# Patient Record
Sex: FEMALE | Race: WHITE | NOT HISPANIC OR LATINO | Employment: OTHER | URBAN - METROPOLITAN AREA
[De-identification: names, ages, dates, MRNs, and addresses within clinical notes are randomized per-mention and may not be internally consistent; named-entity substitution may affect disease eponyms.]

---

## 2018-01-18 ENCOUNTER — TELEPHONE (OUTPATIENT)
Dept: ELECTROPHYSIOLOGY | Facility: CLINIC | Age: 78
End: 2018-01-18

## 2018-01-19 ENCOUNTER — OFFICE VISIT (OUTPATIENT)
Dept: CARDIOLOGY | Facility: CLINIC | Age: 78
End: 2018-01-19
Payer: MEDICARE

## 2018-01-19 VITALS — WEIGHT: 174 LBS | DIASTOLIC BLOOD PRESSURE: 84 MMHG | HEART RATE: 150 BPM | SYSTOLIC BLOOD PRESSURE: 127 MMHG

## 2018-01-19 DIAGNOSIS — I10 DIABETES MELLITUS WITH COINCIDENT HYPERTENSION: ICD-10-CM

## 2018-01-19 DIAGNOSIS — Z79.899 LONG TERM CURRENT USE OF ANTIARRHYTHMIC DRUG: ICD-10-CM

## 2018-01-19 DIAGNOSIS — Z92.89 HISTORY OF CARDIOVERSION: ICD-10-CM

## 2018-01-19 DIAGNOSIS — E11.9 DIABETES MELLITUS WITH COINCIDENT HYPERTENSION: ICD-10-CM

## 2018-01-19 DIAGNOSIS — Z95.0 CARDIAC PACEMAKER IN SITU: ICD-10-CM

## 2018-01-19 DIAGNOSIS — E78.5 DYSLIPIDEMIA: ICD-10-CM

## 2018-01-19 DIAGNOSIS — I48.19 PERSISTENT ATRIAL FIBRILLATION: ICD-10-CM

## 2018-01-19 DIAGNOSIS — I49.5 SICK SINUS SYNDROME: ICD-10-CM

## 2018-01-19 DIAGNOSIS — I10 ESSENTIAL HYPERTENSION: ICD-10-CM

## 2018-01-19 PROCEDURE — 99205 OFFICE O/P NEW HI 60 MIN: CPT | Mod: S$GLB,,, | Performed by: INTERNAL MEDICINE

## 2018-01-19 NOTE — PROGRESS NOTES
Subjective:     HPI    Cardiologist: Marc Olivares MD    I had the pleasure of seeing Sayda Mccartney in consultation at your request for the evaluation of atrial arrhythmias. She is a 77 year old female with a history of HTN, HLD, DM2, and sick sinus syndrome status-post [employer] dual chamber pacemaker implantation in 7/2008. Her AF history dates back prior to her pacemaker implantation, when she would have episodes of paroxysmal AF with RVR which would then revert to profound sinus bradycardia. She has been on Sotalol for many years, and has been on 160 mg bid for a number of years. She would have rare episodes lasting minutes up until recently, but has had three cardioversions for persistent AF over the past few months, including two in 1/2018 which were two days apart. Sotalol was changed to Amiodarone after the most recent DCCV. She is in Xarelto for stroke prevention.    I reviewed a device check dated 12/19/2017 which showed she had a <1% AF burden since 5/4/2017. Episodes would last on average several minutes, with the longest episode lasting 30 minutes.    Recent cardiac studies include an echo done in 1/2018 which showed an EF of 40% with concentric LVH, and no significant valvular disease.    An ECG dated 1/2/2018 shows sinus rhythm. Today's ECG shows AF with RVR.    Review of Systems   Constitution: Negative for decreased appetite, malaise/fatigue, weight gain and weight loss.   HENT: Negative for sore throat.    Eyes: Negative for blurred vision.   Cardiovascular: Positive for chest pain, dyspnea on exertion and palpitations. Negative for irregular heartbeat, leg swelling, near-syncope, orthopnea, paroxysmal nocturnal dyspnea and syncope.   Respiratory: Negative for shortness of breath.    Skin: Negative for rash.   Musculoskeletal: Negative for arthritis.   Gastrointestinal: Negative for abdominal pain.   Neurological: Negative for focal weakness.   Psychiatric/Behavioral: Negative for altered mental  status.        Objective:    Physical Exam   Constitutional: She is oriented to person, place, and time. She appears well-developed and well-nourished. No distress.   HENT:   Head: Normocephalic and atraumatic.   Mouth/Throat: Oropharynx is clear and moist.   Eyes: Conjunctivae are normal. Pupils are equal, round, and reactive to light. No scleral icterus.   Neck: Normal range of motion. Neck supple. No JVD present. No thyromegaly present.   Cardiovascular: Normal heart sounds and intact distal pulses.  An irregularly irregular rhythm present. Tachycardia present.  Exam reveals no gallop and no friction rub.    No murmur heard.  Pulmonary/Chest: Effort normal and breath sounds normal. No respiratory distress.   Abdominal: Soft. Bowel sounds are normal. She exhibits no distension.   Musculoskeletal: She exhibits no edema.   Neurological: She is alert and oriented to person, place, and time.   Skin: Skin is warm and dry.   Psychiatric: She has a normal mood and affect. Her behavior is normal.   Vitals reviewed.        Assessment:       1. Persistent atrial fibrillation    2. History of cardioversion    3. Long term current use of antiarrhythmic drug    4. Sick sinus syndrome    5. Cardiac pacemaker in situ    6. Essential hypertension    7. Dyslipidemia    8. Diabetes mellitus with coincident hypertension         Plan:     In summary, Sayda Mccartney is a 77 year old female with a history of sick sinus syndrome, pacemaker implantation, and symptomatic PAF which has recently become persistent. She has failed Sotalol. We briefly discussed the pros and cons of ablation vs Amiodarone, but given she is in AF with RVR at this point my priority is reestablishing sinus rhythm. I have scheduled her for DCCV next week (no BRYAN needed as she has been compliant with Xarelto). By that time she will have completed her Amiodarone load, making it much more likely that she will maintain sinus rhythm. She will see me in 4 weeks, at  which time we will review rhythm control options in more detail.    Thank you for allowing me to participate in the care of this patient. Please do not hesitate to call me with any questions or concerns.

## 2018-02-06 ENCOUNTER — TELEPHONE (OUTPATIENT)
Dept: ELECTROPHYSIOLOGY | Facility: CLINIC | Age: 78
End: 2018-02-06

## 2018-02-06 RX ORDER — METOPROLOL SUCCINATE 50 MG/1
50 TABLET, EXTENDED RELEASE ORAL DAILY
Qty: 30 TABLET | Refills: 11 | Status: SHIPPED | OUTPATIENT
Start: 2018-02-06 | End: 2018-04-27 | Stop reason: SDUPTHER

## 2018-02-06 NOTE — TELEPHONE ENCOUNTER
Pt underwent DCCV last week on Amiodarone but only stayed in sinus rhythm for 5 days before reverting back to AF.    Discussed risks, benefits, indications, and alternatives to PVI. Risks discussed include bleeding, vascular damage, cardiac tamponade, stroke, PV stenosis, AE fistula, phrenic nerve damage, and death. After considering her options she has agreed to proceed.    Ms. Mccartney is tachycardic in 130s bpm range currently. Have started Toprol XL 50 mg daily. Will contact her at end of week and double Toprol if she is still >110 bpm.

## 2018-02-09 DIAGNOSIS — I48.19 PERSISTENT ATRIAL FIBRILLATION: Primary | ICD-10-CM

## 2018-02-09 NOTE — PROGRESS NOTES
ABLATION EDUCATION CHECKLIST    2-28-18 - LAB WORK   PRE - PROCEDURE LABS HAVE BEEN ORDERED FOR YOU @ [facility]  BE SURE TO ARRIVE AT YOUR SCHEDULED TIME FOR THIS LAB WORK!    2-28-18 - CAT SCAN OF THE CHEST @ 8:00AM  [facility]      3-06-18 @ 6 AM - CARDIAC ABLATION  Report to Cardiology Waiting Room on 3rd floor of the Hospital    (Do not report to clinic)  Directions for Reporting to Cardiology Waiting Area in the Hospital  If you park in the Parking Garage:  Take elevators to the 2nd floor  Walk up ramp and turn right by Gold Elevators  Take elevator to the 3rd floor  Upon exiting the elevator, turn away from the clinic areas  Walk long phillips around to front of hospital to area with windows overlooking [facility]  Check in at Reception Desk  OR  If family is dropping you off:  Have them drop you off at the front of the Hospital  (Near the ER, where all the flags are hung).  Take the E elevators to the 3rd floor.  Check in at the Reception Desk in the waiting room.        Do not eat or drink anything after: 12 mn on the night before your procedure    Medications:   HOLD XARELTO  1 DAY PRIOR TO PROCEDURE. LAST DOSE 3/04/18.  You may take ALL OTHER morning medications with a sip of water    You will be spending the night after your procedure  You will need someone to drive you home the day after your procedure.    Your pain during your procedure will be managed by the anesthesia team.     THE ABOVE INSTRUCTIONS WERE GIVEN TO THE PATIENT VERBALLY AND THEY VERBALIZED UNDERSTANDING.  THEY DO NOT REQUIRE ANY SPECIAL NEEDS AND DO NOT HAVE ANY LEARNING BARRIERS.    Any need to reschedule or cancel procedures, or any questions regarding your procedures should be addressed directly with the Arrhythmia Department Nurses at the following phone number: [phone]

## 2018-03-02 ENCOUNTER — OFFICE VISIT (OUTPATIENT)
Dept: CARDIOLOGY | Facility: CLINIC | Age: 78
End: 2018-03-02
Payer: MEDICARE

## 2018-03-02 DIAGNOSIS — Z95.0 CARDIAC PACEMAKER IN SITU: ICD-10-CM

## 2018-03-02 DIAGNOSIS — E78.5 DYSLIPIDEMIA: ICD-10-CM

## 2018-03-02 DIAGNOSIS — I10 DIABETES MELLITUS WITH COINCIDENT HYPERTENSION: ICD-10-CM

## 2018-03-02 DIAGNOSIS — I10 ESSENTIAL HYPERTENSION: ICD-10-CM

## 2018-03-02 DIAGNOSIS — Z79.899 LONG TERM CURRENT USE OF ANTIARRHYTHMIC DRUG: ICD-10-CM

## 2018-03-02 DIAGNOSIS — E11.9 DIABETES MELLITUS WITH COINCIDENT HYPERTENSION: ICD-10-CM

## 2018-03-02 DIAGNOSIS — I48.19 PERSISTENT ATRIAL FIBRILLATION: Primary | ICD-10-CM

## 2018-03-02 DIAGNOSIS — I49.5 SICK SINUS SYNDROME: ICD-10-CM

## 2018-03-02 DIAGNOSIS — Z92.89 HISTORY OF CARDIOVERSION: ICD-10-CM

## 2018-03-02 PROCEDURE — 99215 OFFICE O/P EST HI 40 MIN: CPT | Mod: S$GLB,,, | Performed by: INTERNAL MEDICINE

## 2018-03-02 NOTE — PROGRESS NOTES
Subjective:     HPI    Cardiologist: Marc Olivares MD    I had the pleasure of seeing Sayda Mccartney in follow-up for her history of atrial arrhythmias. She is a 77 year old female with a history of HTN, HLD, DM2, and sick sinus syndrome status-post [employer] dual chamber pacemaker implantation in 7/2008. Her AF history dates back prior to her pacemaker implantation, when she would have episodes of paroxysmal AF with RVR which would then revert to profound sinus bradycardia. She has been on Sotalol for many years, and has been on 160 mg bid for a number of years. She would have rare episodes lasting minutes up until recently, but has had three cardioversions for persistent AF over the past few months, including two in 1/2018 which were two days apart. Sotalol was changed to Amiodarone after the most recent DCCV. She is in Xarelto for stroke prevention.    At her initial office visit, I reviewed a device check dated 12/19/2017 which showed she had a <1% AF burden since 5/4/2017. Episodes would last on average several minutes, with the longest episode lasting 30 minutes.    Recent cardiac studies include an echo done in 1/2018 which showed an EF of 40% with concentric LVH, and no significant valvular disease.    At her initial office visit, I recommended DCCV after her Amiodarone load was completed. A DCCV was performed, but she reverted to AF within 5 days.    I reviewed today's ECG tracing, which shows AF at 117 bpm.    Review of Systems   Constitution: Negative for decreased appetite, malaise/fatigue, weight gain and weight loss.   HENT: Negative for sore throat.    Eyes: Negative for blurred vision.   Cardiovascular: Positive for chest pain, dyspnea on exertion and palpitations. Negative for irregular heartbeat, leg swelling, near-syncope, orthopnea, paroxysmal nocturnal dyspnea and syncope.   Respiratory: Negative for shortness of breath.    Skin: Negative for rash.   Musculoskeletal: Negative for arthritis.    Gastrointestinal: Negative for abdominal pain.   Neurological: Negative for focal weakness.   Psychiatric/Behavioral: Negative for altered mental status.        Objective:    Physical Exam   Constitutional: She is oriented to person, place, and time. She appears well-developed and well-nourished. No distress.   HENT:   Head: Normocephalic and atraumatic.   Mouth/Throat: Oropharynx is clear and moist.   Eyes: Conjunctivae are normal. Pupils are equal, round, and reactive to light. No scleral icterus.   Neck: Normal range of motion. Neck supple. No JVD present. No thyromegaly present.   Cardiovascular: Normal heart sounds and intact distal pulses.  An irregularly irregular rhythm present. Tachycardia present.  Exam reveals no gallop and no friction rub.    No murmur heard.  Pulmonary/Chest: Effort normal and breath sounds normal. No respiratory distress.   Abdominal: Soft. Bowel sounds are normal. She exhibits no distension.   Musculoskeletal: She exhibits no edema.   Neurological: She is alert and oriented to person, place, and time.   Skin: Skin is warm and dry.   Psychiatric: She has a normal mood and affect. Her behavior is normal.   Vitals reviewed.        Assessment:       1. Persistent atrial fibrillation    2. Sick sinus syndrome    3. History of cardioversion    4. Essential hypertension    5. Dyslipidemia    6. Cardiac pacemaker in situ    7. Diabetes mellitus with coincident hypertension    8. Long term current use of antiarrhythmic drug         Plan:     In summary, Sayda Mccartney is a 77 year old female with a history of sick sinus syndrome, pacemaker implantation, and symptomatic PAF which has recently become persistent. She has failed Sotalol and Amiodarone. We discussed again the risks, benefits, indications, and alternatives to PVI. Risks discussed include bleeding, vascular damage, cardiac tamponade, stroke, PV stenosis, AE fistula, phrenic nerve damage, and death. After considering her options  she has agreed to proceed.     Thank you for allowing me to participate in the care of this patient. Please do not hesitate to call me with any questions or concerns.

## 2018-03-05 ENCOUNTER — ANESTHESIA EVENT (OUTPATIENT)
Dept: MEDSURG UNIT | Facility: HOSPITAL | Age: 78
End: 2018-03-05
Payer: MEDICARE

## 2018-03-05 ENCOUNTER — TELEPHONE (OUTPATIENT)
Dept: ELECTROPHYSIOLOGY | Facility: CLINIC | Age: 78
End: 2018-03-05

## 2018-03-05 NOTE — TELEPHONE ENCOUNTER
Contacted Pt to confirm procedure for tomorrow. Spoke with Pt's spouse. Reviewed time and instructions with him. He voiced understanding and stated she had received a text about tomorrow already.

## 2018-03-05 NOTE — ANESTHESIA PREPROCEDURE EVALUATION
"                                                                                                             03/05/2018  Sayda Mccartney is a 77 y.o., female.    Patient Active Problem List   Diagnosis    Persistent atrial fibrillation    History of cardioversion    Long term current use of antiarrhythmic drug    Sick sinus syndrome    Cardiac pacemaker in situ    Essential hypertension    Dyslipidemia    Diabetes mellitus with coincident hypertension       Anesthesia Evaluation    I have reviewed the Patient Summary Reports.    I have reviewed the Nursing Notes.   I have reviewed the Medications.     Review of Systems  Anesthesia Hx:  No problems with previous Anesthesia  History of prior surgery of interest to airway management or planning: Denies Family Hx of Anesthesia complications.   Denies Personal Hx of Anesthesia complications.   Hematology/Oncology:     Oncology Normal   Hematology Comments: Xarelto   EENT/Dental:EENT/Dental Normal   Cardiovascular:   Exercise tolerance: good Pacemaker Hypertension, well controlled Dysrhythmias (SSS) atrial fibrillation Angina, with exertion hyperlipidemia GODDARD ECG has been reviewed. "Recent cardiac studies include an echo done in 1/2018 which showed an EF of 40% with concentric LVH, and no significant valvular disease." from office visit with Dr. Henao.   Pulmonary:  Pulmonary Normal    Renal/:  Renal/ Normal     Hepatic/GI:  Hepatic/GI Normal    Musculoskeletal:  Spine Disorders: thoracic Degenerative disease    Neurological:  Neurology Normal    Endocrine:   Diabetes, type 2    Dermatological:  Skin Normal    Psych:  Psychiatric Normal           Physical Exam  General:  Well nourished    Airway/Jaw/Neck:  Airway Findings: Mouth Opening: Normal Tongue: Normal  General Airway Assessment: Adult  Mallampati: II  TM Distance: Normal, at least 6 cm        Eyes/Ears/Nose:  EYES/EARS/NOSE FINDINGS: Normal   Dental:  DENTAL FINDINGS: Normal "   Chest/Lungs:  Chest/Lungs Clear    Heart/Vascular:  Heart Findings: Rate: Tachycardia  Rhythm: Regular Rhythm  Heart murmur: negative Vascular Findings: Normal    Abdomen:  Abdomen Findings: Normal    Musculoskeletal:  Musculoskeletal Findings: Normal   Skin:  Skin Findings: Normal    Mental Status:  Mental Status Findings: Normal        Anesthesia Plan  Type of Anesthesia, risks & benefits discussed:  Anesthesia Type:  general  Patient's Preference:   Intra-op Monitoring Plan: standard ASA monitors and arterial line  Intra-op Monitoring Plan Comments:   Post Op Pain Control Plan: per primary service following discharge from PACU  Post Op Pain Control Plan Comments:   Induction:   IV  Beta Blocker:  Patient is on a Beta-Blocker and has received one dose within the past 24 hours (No further documentation required).       Informed Consent: Patient understands risks and agrees with Anesthesia plan.  Questions answered. Anesthesia consent signed with patient.  ASA Score: 3     Day of Surgery Review of History & Physical:    H&P update referred to the provider.  H&P completed by Anesthesiologist.   Anesthesia Plan Notes: Preop EKG showing SVT, endorses SOB at rest, mild dizziness.  EP to evaluate prior to starting procedure.         Ready For Surgery From Anesthesia Perspective.

## 2018-03-06 ENCOUNTER — HOSPITAL ENCOUNTER (OUTPATIENT)
Dept: CARDIOLOGY | Facility: CLINIC | Age: 78
Discharge: HOME | End: 2018-03-06
Attending: INTERNAL MEDICINE | Admitting: INTERNAL MEDICINE
Payer: MEDICARE

## 2018-03-06 ENCOUNTER — HOSPITAL ENCOUNTER (OUTPATIENT)
Facility: HOSPITAL | Age: 78
Discharge: HOME | End: 2018-03-07
Attending: INTERNAL MEDICINE | Admitting: INTERNAL MEDICINE
Payer: MEDICARE

## 2018-03-06 ENCOUNTER — ANESTHESIA (OUTPATIENT)
Dept: MEDSURG UNIT | Facility: HOSPITAL | Age: 78
End: 2018-03-06
Payer: MEDICARE

## 2018-03-06 DIAGNOSIS — I48.19 PERSISTENT ATRIAL FIBRILLATION: Primary | ICD-10-CM

## 2018-03-06 DIAGNOSIS — Z98.890 OTHER SPECIFIED POSTPROCEDURAL STATES: ICD-10-CM

## 2018-03-06 DIAGNOSIS — I48.91 ATRIAL FIBRILLATION: ICD-10-CM

## 2018-03-06 LAB
ABO + RH BLD: NORMAL
AORTIC ATHEROMA: YES
BLD GP AB SCN CELLS X3 SERPL QL: NORMAL
ESTIMATED PA SYSTOLIC PRESSURE: 27.67
MITRAL VALVE MOBILITY: NORMAL
MITRAL VALVE REGURGITATION: ABNORMAL
POCT GLUCOSE: 156 MG/DL (ref 70–110)
POCT GLUCOSE: 185 MG/DL (ref 70–110)
POCT GLUCOSE: 192 MG/DL (ref 70–110)
POCT GLUCOSE: 200 MG/DL (ref 70–110)
TRICUSPID VALVE REGURGITATION: ABNORMAL

## 2018-03-06 PROCEDURE — 93355 ECHO TRANSESOPHAGEAL (TEE): CPT | Mod: 59

## 2018-03-06 PROCEDURE — 36620 INSERTION CATHETER ARTERY: CPT | Mod: 59,,, | Performed by: ANESTHESIOLOGY

## 2018-03-06 PROCEDURE — 27000221 HC OXYGEN, UP TO 24 HOURS

## 2018-03-06 PROCEDURE — 25000003 PHARM REV CODE 250: Performed by: NURSE ANESTHETIST, CERTIFIED REGISTERED

## 2018-03-06 PROCEDURE — 63600175 PHARM REV CODE 636 W HCPCS

## 2018-03-06 PROCEDURE — D9220A PRA ANESTHESIA: Mod: ANES,,, | Performed by: ANESTHESIOLOGY

## 2018-03-06 PROCEDURE — 63600175 PHARM REV CODE 636 W HCPCS: Performed by: NURSE ANESTHETIST, CERTIFIED REGISTERED

## 2018-03-06 PROCEDURE — 93010 ELECTROCARDIOGRAM REPORT: CPT | Mod: 76,,, | Performed by: INTERNAL MEDICINE

## 2018-03-06 PROCEDURE — 37000009 HC ANESTHESIA EA ADD 15 MINS: Performed by: INTERNAL MEDICINE

## 2018-03-06 PROCEDURE — C1893 INTRO/SHEATH, FIXED,NON-PEEL: HCPCS

## 2018-03-06 PROCEDURE — 27200192 EP LAB PROCEDURE

## 2018-03-06 PROCEDURE — 27200049 EP LAB PROCEDURE

## 2018-03-06 PROCEDURE — 93662 INTRACARDIAC ECG (ICE): CPT | Mod: 26,,, | Performed by: INTERNAL MEDICINE

## 2018-03-06 PROCEDURE — 82962 GLUCOSE BLOOD TEST: CPT | Performed by: INTERNAL MEDICINE

## 2018-03-06 PROCEDURE — 94761 N-INVAS EAR/PLS OXIMETRY MLT: CPT

## 2018-03-06 PROCEDURE — 86850 RBC ANTIBODY SCREEN: CPT

## 2018-03-06 PROCEDURE — 93655 ICAR CATH ABLTJ DSCRT ARRHYT: CPT | Mod: ,,, | Performed by: INTERNAL MEDICINE

## 2018-03-06 PROCEDURE — 93656 COMPRE EP EVAL ABLTJ ATR FIB: CPT | Mod: ,,, | Performed by: INTERNAL MEDICINE

## 2018-03-06 PROCEDURE — 93005 ELECTROCARDIOGRAM TRACING: CPT | Mod: 59

## 2018-03-06 PROCEDURE — 27201037 HC PRESSURE MONITORING SET UP

## 2018-03-06 PROCEDURE — 25000003 PHARM REV CODE 250: Performed by: NURSE PRACTITIONER

## 2018-03-06 PROCEDURE — 82962 GLUCOSE BLOOD TEST: CPT | Mod: 91

## 2018-03-06 PROCEDURE — 25000003 PHARM REV CODE 250

## 2018-03-06 PROCEDURE — D9220A PRA ANESTHESIA: Mod: CRNA,,, | Performed by: NURSE ANESTHETIST, CERTIFIED REGISTERED

## 2018-03-06 PROCEDURE — 93355 ECHO TRANSESOPHAGEAL (TEE): CPT | Mod: ,,, | Performed by: INTERNAL MEDICINE

## 2018-03-06 PROCEDURE — 37000008 HC ANESTHESIA 1ST 15 MINUTES: Performed by: INTERNAL MEDICINE

## 2018-03-06 PROCEDURE — 93613 INTRACARDIAC EPHYS 3D MAPG: CPT | Mod: ,,, | Performed by: INTERNAL MEDICINE

## 2018-03-06 PROCEDURE — 63600175 PHARM REV CODE 636 W HCPCS: Performed by: NURSE PRACTITIONER

## 2018-03-06 PROCEDURE — 93010 ELECTROCARDIOGRAM REPORT: CPT | Mod: ,,, | Performed by: INTERNAL MEDICINE

## 2018-03-06 RX ORDER — HYDROCODONE BITARTRATE AND ACETAMINOPHEN 5; 325 MG/1; MG/1
1 TABLET ORAL EVERY 6 HOURS PRN
Status: DISCONTINUED | OUTPATIENT
Start: 2018-03-06 | End: 2018-03-07 | Stop reason: HOSPADM

## 2018-03-06 RX ORDER — AMLODIPINE BESYLATE 10 MG/1
10 TABLET ORAL DAILY
COMMUNITY

## 2018-03-06 RX ORDER — ERGOCALCIFEROL 1.25 MG/1
50000 CAPSULE ORAL
COMMUNITY

## 2018-03-06 RX ORDER — ESMOLOL HYDROCHLORIDE 10 MG/ML
INJECTION INTRAVENOUS
Status: DISCONTINUED | OUTPATIENT
Start: 2018-03-06 | End: 2018-03-06

## 2018-03-06 RX ORDER — CEFAZOLIN SODIUM 1 G/3ML
2 INJECTION, POWDER, FOR SOLUTION INTRAMUSCULAR; INTRAVENOUS
Status: CANCELLED | OUTPATIENT
Start: 2018-03-06 | End: 2018-03-07

## 2018-03-06 RX ORDER — PROPOFOL 10 MG/ML
VIAL (ML) INTRAVENOUS
Status: DISCONTINUED | OUTPATIENT
Start: 2018-03-06 | End: 2018-03-06

## 2018-03-06 RX ORDER — AMIODARONE HYDROCHLORIDE 200 MG/1
200 TABLET ORAL DAILY
Status: DISCONTINUED | OUTPATIENT
Start: 2018-03-07 | End: 2018-03-07 | Stop reason: HOSPADM

## 2018-03-06 RX ORDER — SODIUM CHLORIDE 9 MG/ML
INJECTION, SOLUTION INTRAVENOUS CONTINUOUS PRN
Status: DISCONTINUED | OUTPATIENT
Start: 2018-03-06 | End: 2018-03-06

## 2018-03-06 RX ORDER — HEPARIN SODIUM 10000 [USP'U]/100ML
INJECTION, SOLUTION INTRAVENOUS CONTINUOUS PRN
Status: DISCONTINUED | OUTPATIENT
Start: 2018-03-06 | End: 2018-03-06

## 2018-03-06 RX ORDER — POTASSIUM CHLORIDE 20 MEQ/15ML
20 SOLUTION ORAL DAILY
Status: DISCONTINUED | OUTPATIENT
Start: 2018-03-07 | End: 2018-03-07 | Stop reason: HOSPADM

## 2018-03-06 RX ORDER — FUROSEMIDE 10 MG/ML
INJECTION INTRAMUSCULAR; INTRAVENOUS
Status: DISCONTINUED | OUTPATIENT
Start: 2018-03-06 | End: 2018-03-06

## 2018-03-06 RX ORDER — NEOSTIGMINE METHYLSULFATE 1 MG/ML
INJECTION, SOLUTION INTRAVENOUS
Status: DISCONTINUED | OUTPATIENT
Start: 2018-03-06 | End: 2018-03-06

## 2018-03-06 RX ORDER — ASPIRIN 81 MG/1
81 TABLET ORAL NIGHTLY
COMMUNITY

## 2018-03-06 RX ORDER — IBUPROFEN 200 MG
400 TABLET ORAL EVERY 6 HOURS PRN
Status: DISCONTINUED | OUTPATIENT
Start: 2018-03-06 | End: 2018-03-07 | Stop reason: HOSPADM

## 2018-03-06 RX ORDER — LANOLIN ALCOHOL/MO/W.PET/CERES
400 CREAM (GRAM) TOPICAL NIGHTLY
COMMUNITY

## 2018-03-06 RX ORDER — PROTAMINE SULFATE 10 MG/ML
INJECTION, SOLUTION INTRAVENOUS
Status: DISCONTINUED | OUTPATIENT
Start: 2018-03-06 | End: 2018-03-06

## 2018-03-06 RX ORDER — OMEPRAZOLE 20 MG/1
20 CAPSULE, DELAYED RELEASE ORAL DAILY
Status: ON HOLD | COMMUNITY
End: 2018-03-07

## 2018-03-06 RX ORDER — MEPERIDINE HYDROCHLORIDE 50 MG/ML
25 INJECTION INTRAMUSCULAR; INTRAVENOUS; SUBCUTANEOUS ONCE
Status: COMPLETED | OUTPATIENT
Start: 2018-03-06 | End: 2018-03-06

## 2018-03-06 RX ORDER — ASPIRIN 81 MG/1
81 TABLET ORAL NIGHTLY
Status: DISCONTINUED | OUTPATIENT
Start: 2018-03-06 | End: 2018-03-07 | Stop reason: HOSPADM

## 2018-03-06 RX ORDER — ROCURONIUM BROMIDE 10 MG/ML
INJECTION, SOLUTION INTRAVENOUS
Status: DISCONTINUED | OUTPATIENT
Start: 2018-03-06 | End: 2018-03-06

## 2018-03-06 RX ORDER — GLYCOPYRROLATE 0.2 MG/ML
INJECTION INTRAMUSCULAR; INTRAVENOUS
Status: DISCONTINUED | OUTPATIENT
Start: 2018-03-06 | End: 2018-03-06

## 2018-03-06 RX ORDER — PHENYLEPHRINE HYDROCHLORIDE 10 MG/ML
INJECTION INTRAVENOUS
Status: DISCONTINUED | OUTPATIENT
Start: 2018-03-06 | End: 2018-03-06

## 2018-03-06 RX ORDER — VASOPRESSIN 20 [USP'U]/ML
INJECTION, SOLUTION INTRAMUSCULAR; SUBCUTANEOUS
Status: DISCONTINUED | OUTPATIENT
Start: 2018-03-06 | End: 2018-03-06

## 2018-03-06 RX ORDER — SODIUM CHLORIDE 9 MG/ML
INJECTION, SOLUTION INTRAVENOUS CONTINUOUS
Status: DISCONTINUED | OUTPATIENT
Start: 2018-03-06 | End: 2018-03-07 | Stop reason: HOSPADM

## 2018-03-06 RX ORDER — METOPROLOL SUCCINATE 25 MG/1
50 TABLET, EXTENDED RELEASE ORAL DAILY
Status: DISCONTINUED | OUTPATIENT
Start: 2018-03-07 | End: 2018-03-07 | Stop reason: HOSPADM

## 2018-03-06 RX ORDER — GLIPIZIDE 10 MG/1
10 TABLET ORAL NIGHTLY
COMMUNITY

## 2018-03-06 RX ORDER — PRAVASTATIN SODIUM 20 MG/1
40 TABLET ORAL NIGHTLY
Status: DISCONTINUED | OUTPATIENT
Start: 2018-03-06 | End: 2018-03-07 | Stop reason: HOSPADM

## 2018-03-06 RX ORDER — FENTANYL CITRATE 50 UG/ML
25 INJECTION, SOLUTION INTRAMUSCULAR; INTRAVENOUS EVERY 5 MIN PRN
Status: DISCONTINUED | OUTPATIENT
Start: 2018-03-06 | End: 2018-03-07 | Stop reason: HOSPADM

## 2018-03-06 RX ORDER — HEPARIN SODIUM 1000 [USP'U]/ML
INJECTION, SOLUTION INTRAVENOUS; SUBCUTANEOUS
Status: DISCONTINUED | OUTPATIENT
Start: 2018-03-06 | End: 2018-03-06

## 2018-03-06 RX ORDER — MORPHINE SULFATE 2 MG/ML
1 INJECTION, SOLUTION INTRAMUSCULAR; INTRAVENOUS ONCE
Status: DISCONTINUED | OUTPATIENT
Start: 2018-03-06 | End: 2018-03-06

## 2018-03-06 RX ORDER — FENTANYL CITRATE 50 UG/ML
INJECTION, SOLUTION INTRAMUSCULAR; INTRAVENOUS
Status: DISCONTINUED | OUTPATIENT
Start: 2018-03-06 | End: 2018-03-06

## 2018-03-06 RX ORDER — POTASSIUM GLUCONATE 595(99)MG
TABLET, EXTENDED RELEASE ORAL NIGHTLY
COMMUNITY

## 2018-03-06 RX ORDER — PRAVASTATIN SODIUM 40 MG/1
40 TABLET ORAL NIGHTLY
COMMUNITY

## 2018-03-06 RX ORDER — ONDANSETRON 2 MG/ML
INJECTION INTRAMUSCULAR; INTRAVENOUS
Status: DISCONTINUED | OUTPATIENT
Start: 2018-03-06 | End: 2018-03-06

## 2018-03-06 RX ORDER — MIDAZOLAM HYDROCHLORIDE 1 MG/ML
INJECTION, SOLUTION INTRAMUSCULAR; INTRAVENOUS
Status: DISCONTINUED | OUTPATIENT
Start: 2018-03-06 | End: 2018-03-06

## 2018-03-06 RX ORDER — AMIODARONE HYDROCHLORIDE 200 MG/1
200 TABLET ORAL DAILY
COMMUNITY

## 2018-03-06 RX ORDER — PANTOPRAZOLE SODIUM 40 MG/1
40 TABLET, DELAYED RELEASE ORAL DAILY
Status: DISCONTINUED | OUTPATIENT
Start: 2018-03-06 | End: 2018-03-06

## 2018-03-06 RX ORDER — ACETAMINOPHEN 325 MG/1
650 TABLET ORAL EVERY 6 HOURS PRN
Status: DISCONTINUED | OUTPATIENT
Start: 2018-03-06 | End: 2018-03-07 | Stop reason: HOSPADM

## 2018-03-06 RX ORDER — PANTOPRAZOLE SODIUM 40 MG/1
40 TABLET, DELAYED RELEASE ORAL DAILY
Status: DISCONTINUED | OUTPATIENT
Start: 2018-03-07 | End: 2018-03-07 | Stop reason: HOSPADM

## 2018-03-06 RX ORDER — SODIUM CHLORIDE 0.9 % (FLUSH) 0.9 %
3 SYRINGE (ML) INJECTION
Status: DISCONTINUED | OUTPATIENT
Start: 2018-03-06 | End: 2018-03-07 | Stop reason: HOSPADM

## 2018-03-06 RX ADMIN — HYDROCODONE BITARTRATE AND ACETAMINOPHEN 1 TABLET: 5; 325 TABLET ORAL at 08:03

## 2018-03-06 RX ADMIN — PROTAMINE SULFATE 90 MG: 10 INJECTION, SOLUTION INTRAVENOUS at 12:03

## 2018-03-06 RX ADMIN — MEPERIDINE HYDROCHLORIDE 25 MG: 50 INJECTION, SOLUTION INTRAMUSCULAR; INTRAVENOUS; SUBCUTANEOUS at 04:03

## 2018-03-06 RX ADMIN — PHENYLEPHRINE HYDROCHLORIDE 200 MCG: 10 INJECTION INTRAVENOUS at 07:03

## 2018-03-06 RX ADMIN — ONDANSETRON 4 MG: 2 INJECTION INTRAMUSCULAR; INTRAVENOUS at 11:03

## 2018-03-06 RX ADMIN — SODIUM CHLORIDE, SODIUM GLUCONATE, SODIUM ACETATE, POTASSIUM CHLORIDE, MAGNESIUM CHLORIDE, SODIUM PHOSPHATE, DIBASIC, AND POTASSIUM PHOSPHATE: .53; .5; .37; .037; .03; .012; .00082 INJECTION, SOLUTION INTRAVENOUS at 08:03

## 2018-03-06 RX ADMIN — HEPARIN SODIUM 6000 UNITS: 1000 INJECTION INTRAVENOUS; SUBCUTANEOUS at 09:03

## 2018-03-06 RX ADMIN — VASOPRESSIN 3 UNITS: 20 INJECTION INTRAVENOUS at 07:03

## 2018-03-06 RX ADMIN — HYDROCODONE BITARTRATE AND ACETAMINOPHEN 1 TABLET: 5; 325 TABLET ORAL at 02:03

## 2018-03-06 RX ADMIN — PROPOFOL 120 MG: 10 INJECTION, EMULSION INTRAVENOUS at 07:03

## 2018-03-06 RX ADMIN — MIDAZOLAM 2 MG: 1 INJECTION INTRAMUSCULAR; INTRAVENOUS at 07:03

## 2018-03-06 RX ADMIN — FUROSEMIDE 40 MG: 10 INJECTION, SOLUTION INTRAMUSCULAR; INTRAVENOUS at 11:03

## 2018-03-06 RX ADMIN — PHENYLEPHRINE HYDROCHLORIDE 200 MCG: 10 INJECTION INTRAVENOUS at 11:03

## 2018-03-06 RX ADMIN — HEPARIN SODIUM 9000 UNITS: 1000 INJECTION INTRAVENOUS; SUBCUTANEOUS at 08:03

## 2018-03-06 RX ADMIN — GLYCOPYRROLATE 0.4 MG: 0.2 INJECTION INTRAMUSCULAR; INTRAVENOUS at 12:03

## 2018-03-06 RX ADMIN — PRAVASTATIN SODIUM 40 MG: 20 TABLET ORAL at 08:03

## 2018-03-06 RX ADMIN — FENTANYL CITRATE 100 MCG: 50 INJECTION, SOLUTION INTRAMUSCULAR; INTRAVENOUS at 07:03

## 2018-03-06 RX ADMIN — NEOSTIGMINE METHYLSULFATE 4 MG: 1 INJECTION INTRAVENOUS at 12:03

## 2018-03-06 RX ADMIN — PROTAMINE SULFATE 10 MG: 10 INJECTION, SOLUTION INTRAVENOUS at 11:03

## 2018-03-06 RX ADMIN — VASOPRESSIN 1 UNITS: 20 INJECTION INTRAVENOUS at 07:03

## 2018-03-06 RX ADMIN — ESMOLOL HYDROCHLORIDE 30 MG: 10 INJECTION INTRAVENOUS at 07:03

## 2018-03-06 RX ADMIN — HEPARIN SODIUM AND DEXTROSE 4000 UNITS/HR: 10000; 5 INJECTION INTRAVENOUS at 09:03

## 2018-03-06 RX ADMIN — DEXTROSE 2 G: 50 INJECTION, SOLUTION INTRAVENOUS at 08:03

## 2018-03-06 RX ADMIN — IBUPROFEN 400 MG: 200 TABLET, FILM COATED ORAL at 07:03

## 2018-03-06 RX ADMIN — SODIUM CHLORIDE: 0.9 INJECTION, SOLUTION INTRAVENOUS at 07:03

## 2018-03-06 RX ADMIN — ROCURONIUM BROMIDE 30 MG: 10 INJECTION, SOLUTION INTRAVENOUS at 07:03

## 2018-03-06 RX ADMIN — SODIUM CHLORIDE 0.5 MCG/KG/MIN: 9 INJECTION, SOLUTION INTRAVENOUS at 07:03

## 2018-03-06 NOTE — H&P
TRANSESOPHAGEAL ECHOCARDIOGRAPHY   PRE-PROCEDURE NOTE    2018    HPI:     Sayda Mccartney is a 77 y.o. woman with PMHx of HTN, HLD, SSS s/p SJM dcPPM 2008, and atrial fibrillation s/p multiple DCCV's. She presents today for RFA PVI by Dr. Henao and is referred for BRYAN prior to her procedure.    Patient was previously taking sotalol, later switched to amiodarone for her AAD.  She has been anticoagulated with Xarelto, last dose was on 3/4.  Prior TTE at OSH showed an EF of 40%.    ECG this morning shows an SVT with a rate of 148 bpm.    Dysphagia or odynophagia:  No  Liver Disease, esophageal disease, or known varices:  No  Upper GI Bleeding: No  Snoring:  Yes  Sleep Apnea:  No  Prior neck surgery or radiation:  No  History of anesthetic difficulties:  No  Family history of anesthetic difficulties:  No  Last oral intake:  12 hours ago  Able to move neck in all directions:  Yes    Meds:     Scheduled Meds:  PRN Meds:  Continuous Infusions:   sodium chloride 0.9%         Physical Exam:     Vitals:  Temp:  [97.5 °F (36.4 °C)]   Pulse:  [146]   Resp:  [18]   BP: (100-109)/(61-69)   SpO2:  [97 %]        Constitutional: NAD, conversant  HEENT:   Sclera anicteric, Uvula midline, EOMI, OP clear  Neck:               No JVD, moves to all direction without any limitations  CV:               Regular, tachycardic, no murmurs / rubs / gallops, normal S1/S2  Pulm:               CTAB, no wheezes, rales, or ronchi  GI:               Abdomen soft, NTND, +BS  Extremities:              No LE edema, warm with palpable pulses  Neuro:   AAOX3, no focal motor deficits    Mallampati: 2  ASA: 3    Labs:     No results found for this or any previous visit (from the past 336 hour(s)).    No results found for this or any previous visit (from the past 336 hour(s)).    CrCl cannot be calculated (No order found.).    EK2018  SVT with a rate of 148 bpm    Assessment & Plan:     PLAN:  1. BRYAN for evaluation of CIARA/LA for thrombus  prior to RFA-PVI for persistent atrial fibrillation.    -The risks, benefits & alternatives of the procedure were explained to the patient.    -The risks of transesophageal echo include but are not limited to:  Dental trauma, esophageal trauma/perforation, bleeding, laryngospasm/brochospasm, aspiration, sore throat/hoarseness, & dislodgement of the endotracheal tube/nasogastric tube (where applicable).    -The risks of moderate sedation include hypotension, respiratory depression, arrhythmias, bronchospasm, & death.    -Informed consent was obtained & the patient is agreeable to proceed with the procedure.    I will discuss with the attending physician. Attending addendum is to follow.    Signed:  Jacinto Cho MD  Cardiology Fellow - PGY5  Pager: [phone]  3/6/2018 7:23 AM

## 2018-03-06 NOTE — NURSING TRANSFER
Nursing Transfer Note      3/6/2018     Transfer to 318    Transfer via stretcher    Transfer with portable o2 and tele    Transported by [employer], RN and PCT    Medicines sent: n/a    Chart send with patient: yes    Notified: son

## 2018-03-06 NOTE — INTERVAL H&P NOTE
The patient has been examined and the H&P has been reviewed:    I concur with the findings and no changes have occurred since H&P was written.  Presents for PVI-RFA.  Denies fever, chills, +SOB/+GODDARD- worsening in last 2 weeks.    She takes xarelto: last dose 2 mornings ago  Amiodarone and metoprolol last dose this morning.   Prior to procedure, extensive discussion with patient regarding risks and benefits of  PVI, and patient  would like to proceed.  Risks of procedure include but are not limited to the risk of infection, bleeding, stroke, paralysis,pulmonary vein stenosis, atrioesophageal fistula  MI, death, embolism, perforation requiring emergency draining or surgery, AV block, possibility for need for  pacemaker implantation.  The patient voices understanding and all questions have been answered. No further questions/concerns voiced at this time.      BRYAN prior to rule out thrombus   Sedation per anesthesia.     Active Hospital Problems    Diagnosis  POA    Atrial fibrillation [I48.91]  Yes      Resolved Hospital Problems    Diagnosis Date Resolved POA   No resolved problems to display.     Ifrah Chin, MN, APRN, FNP-BC  Nurse Practitioner  Cardiac Electrophysiology

## 2018-03-06 NOTE — TRANSFER OF CARE
"Anesthesia Transfer of Care Note    Patient: Sayda Mccartney    Procedure(s) Performed: Procedure(s) (LRB):  ABLATION (N/A)  TRANSESOPHAGEAL ECHOCARDIOGRAM (BRYAN) (N/A)    Patient location: PACU    Anesthesia Type: general    Transport from OR: Transported from OR on room air with adequate spontaneous ventilation. Transported from OR on 6-10 L/min O2 by face mask with adequate spontaneous ventilation    Post pain: adequate analgesia    Post assessment: no apparent anesthetic complications and tolerated procedure well    Post vital signs: stable    Level of consciousness: awake and alert    Nausea/Vomiting: no nausea/vomiting    Complications: none    Transfer of care protocol was followed      Last vitals:   Visit Vitals  BP (!) 96/50 (BP Location: Left arm, Patient Position: Lying)   Pulse 97   Temp 36.3 °C (97.3 °F) (Temporal)   Resp 20   Ht 5' 4" (1.626 m)   Wt 77.1 kg (170 lb)   SpO2 100%   Breastfeeding? No   BMI 29.18 kg/m²     "

## 2018-03-06 NOTE — PLAN OF CARE
Problem: Patient Care Overview  Goal: Plan of Care Review  Outcome: Ongoing (interventions implemented as appropriate)  Vital signs stable. Afebrile. Alert, oriented and following commands. PRN pain meds for back pain. Denies nausea. Some swelling to right groin site but remains stable. Left groin site remains well approximated and without redness or hematoma. Hogan intact and draining. Family at bedside and updated regarding POC. All questions, concerns addressed.

## 2018-03-06 NOTE — PLAN OF CARE
Problem: Patient Care Overview  Goal: Plan of Care Review  Outcome: Ongoing (interventions implemented as appropriate)  Pt transferred from recovery via stretcher.  Vss.  Pt aao, family called to bs.  wallace groin sites remain cdi.  0 bleed, 0 hematoma.  Sutures intact.  Post op orders and assessment initiated.  Pt in no acute distress and verbalizes no complaints.  Call bell within reach.  Will continue to monitor.

## 2018-03-06 NOTE — ANESTHESIA PROCEDURE NOTES
Arterial    Diagnosis: Afib    Patient location during procedure: done in OR  Procedure start time: 3/6/2018 7:35 AM  Timeout: 3/6/2018 7:35 AM  Procedure end time: 3/6/2018 7:40 AM  Staffing  Anesthesiologist: NIRMALA ALEXANDRE  Performed: anesthesiologist   Anesthesiologist was present at the time of the procedure.  Preanesthetic Checklist  Completed: patient identified, site marked, surgical consent, pre-op evaluation, timeout performed, IV checked, risks and benefits discussed, monitors and equipment checked and anesthesia consent givenArterial  Skin Prep: chlorhexidine gluconate  Local Infiltration: none  Orientation: right  Location: radial  Catheter Size: 20 G  Catheter placement by Anatomical landmarks. Heme positive aspiration all ports.Insertion Attempts: 1  Assessment  Dressing: secured with tape and tegaderm  Patient: Tolerated well

## 2018-03-06 NOTE — NURSING
ELIZABETH gan and STEFANY Jc called to bedside, holding pressure to r side hematoma.  Sutures removed.  Vss.  Pt complaints of back pain.  Orders received and initiated for pain medication.    Pressure held for 45/50mins, Dr. Henao at bedside, applying safeguard to wallace. Groin sites.   wallace groin sites now soft, no bleeding, no hematoma.  Orders received and initiated to keep safeguards to wallace groin sites until 9pm,  pt to remain on bedrest until 11pm.      call bell within reach.  Family at bedside.  Will continue to monitor.

## 2018-03-07 VITALS
HEIGHT: 64 IN | TEMPERATURE: 98 F | BODY MASS INDEX: 29.02 KG/M2 | WEIGHT: 170 LBS | HEART RATE: 58 BPM | DIASTOLIC BLOOD PRESSURE: 53 MMHG | SYSTOLIC BLOOD PRESSURE: 108 MMHG | RESPIRATION RATE: 18 BRPM | OXYGEN SATURATION: 96 %

## 2018-03-07 PROBLEM — Z79.01 CURRENT USE OF ANTICOAGULANT THERAPY: Status: ACTIVE | Noted: 2018-03-07

## 2018-03-07 LAB
POC ACTIVATED CLOTTING TIME K: 164 SEC (ref 74–137)
POC ACTIVATED CLOTTING TIME K: 197 SEC (ref 74–137)
POC ACTIVATED CLOTTING TIME K: 252 SEC (ref 74–137)
POC ACTIVATED CLOTTING TIME K: 357 SEC (ref 74–137)
POC ACTIVATED CLOTTING TIME K: 395 SEC (ref 74–137)
POC ACTIVATED CLOTTING TIME K: 433 SEC (ref 74–137)
POC ACTIVATED CLOTTING TIME K: 450 SEC (ref 74–137)
POC ACTIVATED CLOTTING TIME K: 450 SEC (ref 74–137)
SAMPLE: ABNORMAL

## 2018-03-07 PROCEDURE — 82962 GLUCOSE BLOOD TEST: CPT

## 2018-03-07 PROCEDURE — 51798 US URINE CAPACITY MEASURE: CPT

## 2018-03-07 PROCEDURE — 25000003 PHARM REV CODE 250: Performed by: NURSE PRACTITIONER

## 2018-03-07 RX ORDER — OMEPRAZOLE 20 MG/1
20 CAPSULE, DELAYED RELEASE ORAL DAILY
Start: 2018-03-07

## 2018-03-07 RX ORDER — PANTOPRAZOLE SODIUM 40 MG/1
40 TABLET, DELAYED RELEASE ORAL DAILY
Qty: 30 TABLET | Refills: 0 | Status: SHIPPED | OUTPATIENT
Start: 2018-03-08 | End: 2018-04-07

## 2018-03-07 RX ORDER — IBUPROFEN 400 MG/1
400 TABLET ORAL EVERY 6 HOURS PRN
Refills: 0 | COMMUNITY
Start: 2018-03-07

## 2018-03-07 RX ADMIN — PANTOPRAZOLE SODIUM 40 MG: 40 TABLET, DELAYED RELEASE ORAL at 08:03

## 2018-03-07 RX ADMIN — AMIODARONE HYDROCHLORIDE 200 MG: 200 TABLET ORAL at 08:03

## 2018-03-07 RX ADMIN — METOPROLOL SUCCINATE 50 MG: 25 TABLET, EXTENDED RELEASE ORAL at 08:03

## 2018-03-07 RX ADMIN — ASPIRIN 81 MG: 81 TABLET, COATED ORAL at 12:03

## 2018-03-07 RX ADMIN — POTASSIUM CHLORIDE 20 MEQ: 20 SOLUTION ORAL at 08:03

## 2018-03-07 RX ADMIN — RIVAROXABAN 20 MG: 20 TABLET, FILM COATED ORAL at 09:03

## 2018-03-07 NOTE — HPI
Sayda Mccartney is a 77 year old female with a history of sick sinus syndrome, pacemaker implantation, and symptomatic PAF which has recently become persistent. She has failed Sotalol and Amiodarone

## 2018-03-07 NOTE — ANESTHESIA POSTPROCEDURE EVALUATION
"Anesthesia Post Evaluation    Patient: Sayda Mccartney    Procedure(s) Performed: Procedure(s) (LRB):  ABLATION (N/A)  TRANSESOPHAGEAL ECHOCARDIOGRAM (BRYAN) (N/A)    Final Anesthesia Type: general  Patient location during evaluation: telemetry step down  Patient participation: Yes- Able to Participate  Level of consciousness: awake and alert, oriented and awake  Post-procedure vital signs: reviewed and stable  Pain management: adequate  Airway patency: patent  PONV status at discharge: No PONV  Anesthetic complications: no      Cardiovascular status: blood pressure returned to baseline and hemodynamically stable  Respiratory status: unassisted, spontaneous ventilation and room air  Hydration status: euvolemic  Follow-up not needed.        Visit Vitals  BP (!) 120/58 (BP Location: Left arm, Patient Position: Lying)   Pulse 60   Temp 36.9 °C (98.4 °F) (Oral)   Resp 18   Ht 5' 4" (1.626 m)   Wt 77.1 kg (170 lb)   SpO2 98%   Breastfeeding? No   BMI 29.18 kg/m²       Pain/Haseeb Score: Pain Assessment Performed: Yes (3/7/2018  9:00 AM)  Presence of Pain: denies (3/7/2018  9:00 AM)  Pain Rating Prior to Med Admin: 7 (3/6/2018  8:17 PM)  Pain Rating Post Med Admin: 3 (3/6/2018  9:17 PM)  Haseeb Score: 9 (3/6/2018  2:45 PM)      "

## 2018-03-07 NOTE — PROGRESS NOTES
[facility]  Cardiac Electrophysiology  Progress Note    Admission Date: 3/6/2018  Code Status: No Order   Attending Physician: Dennys Henao MD   Expected Discharge Date: 3/7/2018  Principal Problem:Atrial fibrillation    Subjective:     Interval History: Post PVI-RFA Day #1.  Feels great.    Review of Systems   Constitution: Negative for chills, decreased appetite, fever, weakness, weight gain and weight loss.   Eyes: Negative for blurred vision.   Cardiovascular: Negative for chest pain, claudication, leg swelling and syncope.   Respiratory: Negative for cough and shortness of breath.    Hematologic/Lymphatic: Negative for bleeding problem. Does not bruise/bleed easily.   Skin: Negative for rash.   Musculoskeletal: Negative for joint pain, muscle cramps and muscle weakness.   Gastrointestinal: Negative for abdominal pain, change in bowel habit, diarrhea, nausea and vomiting.   Genitourinary: Negative for bladder incontinence.   Neurological: Negative for headaches, numbness and paresthesias.   Psychiatric/Behavioral: Negative for altered mental status.     Objective:     Vital Signs (Most Recent):  Temp: 98.4 °F (36.9 °C) (03/07/18 0725)  Pulse: 60 (03/07/18 0800)  Resp: 18 (03/07/18 0725)  BP: (!) 108/53 (03/07/18 0725)  SpO2: (!) 93 % (03/07/18 0725) Vital Signs (24h Range):  Temp:  [97 °F (36.1 °C)-98.4 °F (36.9 °C)] 98.4 °F (36.9 °C)  Pulse:  [59-97] 60  Resp:  [14-23] 18  SpO2:  [92 %-100 %] 93 %  BP: ()/(47-65) 108/53     Weight: 77.1 kg (170 lb)  Body mass index is 29.18 kg/m².     SpO2: (!) 93 %  O2 Device (Oxygen Therapy): room air    Physical Exam   Constitutional: She is oriented to person, place, and time. She appears well-developed and well-nourished.   Neck: Neck supple.   Cardiovascular: Normal rate, regular rhythm and intact distal pulses.    Pulmonary/Chest: Effort normal. No stridor. No respiratory distress. She has no wheezes.   Abdominal: Soft. She exhibits no  distension. There is no tenderness.   Musculoskeletal: Normal range of motion.   Neurological: She is alert and oriented to person, place, and time. She exhibits normal muscle tone. Coordination normal.   Skin: Skin is warm and dry. No rash noted. No erythema.   Psychiatric: She has a normal mood and affect.   Vitals reviewed.      Assessment and Plan:     * Atrial fibrillation    S/p RFA-PVI post day #1  Bilateral groin hematomas stable with bruising noted R>L  No void post thrasher d/c about 8 hours ago; bladder scan at 7am with 75 cc  Oral intake encouraged        Current use of anticoagulant therapy    Xarelto dose given with breakfast this morning which patient reports is her biggest meal of the day  Observe for bleeding 1-2 hours post xarelto             Ifrah Chin NP  Cardiac Electrophysiology  [facility]

## 2018-03-07 NOTE — SUBJECTIVE & OBJECTIVE
Interval History: Post PVI-RFA Day #1.  Feels great.    Review of Systems   Constitution: Negative for chills, decreased appetite, fever, weakness, weight gain and weight loss.   Eyes: Negative for blurred vision.   Cardiovascular: Negative for chest pain, claudication, leg swelling and syncope.   Respiratory: Negative for cough and shortness of breath.    Hematologic/Lymphatic: Negative for bleeding problem. Does not bruise/bleed easily.   Skin: Negative for rash.   Musculoskeletal: Negative for joint pain, muscle cramps and muscle weakness.   Gastrointestinal: Negative for abdominal pain, change in bowel habit, diarrhea, nausea and vomiting.   Genitourinary: Negative for bladder incontinence.   Neurological: Negative for headaches, numbness and paresthesias.   Psychiatric/Behavioral: Negative for altered mental status.     Objective:     Vital Signs (Most Recent):  Temp: 98.4 °F (36.9 °C) (03/07/18 0725)  Pulse: 60 (03/07/18 0800)  Resp: 18 (03/07/18 0725)  BP: (!) 108/53 (03/07/18 0725)  SpO2: (!) 93 % (03/07/18 0725) Vital Signs (24h Range):  Temp:  [97 °F (36.1 °C)-98.4 °F (36.9 °C)] 98.4 °F (36.9 °C)  Pulse:  [59-97] 60  Resp:  [14-23] 18  SpO2:  [92 %-100 %] 93 %  BP: ()/(47-65) 108/53     Weight: 77.1 kg (170 lb)  Body mass index is 29.18 kg/m².     SpO2: (!) 93 %  O2 Device (Oxygen Therapy): room air    Physical Exam   Constitutional: She is oriented to person, place, and time. She appears well-developed and well-nourished.   Neck: Neck supple.   Cardiovascular: Normal rate, regular rhythm and intact distal pulses.    Pulmonary/Chest: Effort normal. No stridor. No respiratory distress. She has no wheezes.   Abdominal: Soft. She exhibits no distension. There is no tenderness.   Musculoskeletal: Normal range of motion.   Neurological: She is alert and oriented to person, place, and time. She exhibits normal muscle tone. Coordination normal.   Skin: Skin is warm and dry. No rash noted. No erythema.    Psychiatric: She has a normal mood and affect.   Vitals reviewed.

## 2018-03-07 NOTE — PLAN OF CARE
Problem: Patient Care Overview  Goal: Plan of Care Review  Removed safeguards last night without bleeding.   Applied gauze and tegaderm.  Pt ambulated around 2300 last night, tolerated well.  Bilaral groin sites remained CDI, no bleeding or hematoma after ambulation. Pt does have chronic back pain and took Norco last night.  Pt remained on oxygen last, she states she uses 2L at home at night.  Oxygen sats have been in the high 90s overnight.  Will cont to monitor.

## 2018-03-07 NOTE — PROGRESS NOTES
Pt DC'd per MD order. Discharge instructions given including activity,  wound care, S&S of infections, future appointments, and when to call MD. Medications reviewed including drug name, indication, dosage, frequency, and when to take next dose. Telemetry and PIV DC'd, catheter tip intact. Pt left unit via wheelchair with family.

## 2018-03-07 NOTE — DISCHARGE SUMMARY
[facility]  Cardiac Electrophysiology  Discharge Summary      Patient Name: Sayda Mccartney  MRN: [id number]  Admission Date: 3/6/2018  Hospital Length of Stay: 0 days  Discharge Date and Time: 3/7/2018 11:55 AM  Attending Physician: Dennys Henao MD    Discharging Provider: Ifrah Chin NP  Primary Care Physician: Sarah Comer    HPI:   Sayda Mccartney is a 77 year old female with a history of sick sinus syndrome, pacemaker implantation, and symptomatic PAF which has recently become persistent. She has failed Sotalol and Amiodarone    Procedure(s) (LRB):  ABLATION (N/A)  TRANSESOPHAGEAL ECHOCARDIOGRAM (BRYAN) (N/A)     Hospital Course:  S/p PVI-Ablation on 3/6/18 (see procedure note for details). Tolerated procedure. Bleeding with hematoma to groins several hours post procedure after patient reportedly coughed. Dr. Henao at bedside, pressure applied; safe guards applied to bilateral groins once hemostasis achieved. No issues overnight. Telemetry reviewed. Tolerating diet, ambulating, voiding, pain well controlled. Bilateral groins with Tegaderm; no active bleeding. Hematoma and bruising/discoloration to Bilateral groins stable without increase in size following safe guard removal last night.  Discharge plans/instructions discussed with patient and family who verbalized understanding and agreement with plans of care.  No further questions or concerns voiced at this time.    Consults: Anesthesia    Final Active Diagnoses:    Diagnosis Date Noted POA    PRINCIPAL PROBLEM:  Atrial fibrillation [I48.91] 03/06/2018 Yes    Current use of anticoagulant therapy [Z79.01] 03/07/2018 Not Applicable    Cardiac pacemaker in situ [Z95.0] 01/19/2018 Yes      Problems Resolved During this Admission:    Diagnosis Date Noted Date Resolved POA     Discharged Condition: good    Disposition: Home or Self Care    Follow Up:  Follow-up Information     Dennys Henao MD In 8 weeks.    Specialties:   Electrophysiology, Cardiovascular Disease  Why:  post RFA-PVI  Contact information:  [address]  [facility] [id number]  [phone]                 Patient Instructions:     Diet Cardiac     Diet diabetic     Activity as tolerated     Lifting restrictions   Order Comments: No lifting more than 5-10 pounds x 1 week     Call MD for:  temperature >100.4     Call MD for:  persistent nausea and vomiting or diarrhea     Call MD for:  severe uncontrolled pain     Call MD for:  redness, tenderness, or signs of infection (pain, swelling, redness, odor or green/yellow discharge around incision site)     Call MD for:  difficulty breathing or increased cough     Call MD for:  severe persistent headache     Call MD for:  worsening rash     Call MD for:  persistent dizziness, light-headedness, or visual disturbances     Call MD for:  increased confusion or weakness     Call MD for:   Order Comments: If unable to void.   Any concerns regarding procedure     Change dressing (specify)   Order Comments: May remove dressings tonight. No tub baths or soaking in water x 3 days.  Apply warm compresses to groin as needed.       Medications:  Reconciled Home Medications:   Current Discharge Medication List      START taking these medications    Details   ibuprofen (ADVIL,MOTRIN) 400 MG tablet Take 1 tablet (400 mg total) by mouth every 6 (six) hours as needed for Pain (chest discomfort).  Refills: 0      pantoprazole (PROTONIX) 40 MG tablet Take 1 tablet (40 mg total) by mouth once daily.  Qty: 30 tablet, Refills: 0         CONTINUE these medications which have CHANGED    Details   omeprazole (PRILOSEC) 20 MG capsule Take 1 capsule (20 mg total) by mouth once daily. May resume after protonix/pantoprazole prescription completed         CONTINUE these medications which have NOT CHANGED    Details   amiodarone (PACERONE) 200 MG Tab Take by mouth once daily.      amLODIPine (NORVASC) 10 MG tablet Take 10 mg by mouth once daily.       aspirin (ECOTRIN) 81 MG EC tablet Take 81 mg by mouth nightly.      CALCIUM CARBONATE/VITAMIN D3 (CALCIUM 600 WITH VITAMIN D3 ORAL) Take by mouth nightly.      ergocalciferol (VITAMIN D2) 50,000 unit Cap Take 50,000 Units by mouth every 7 days.      glipiZIDE (GLUCOTROL) 10 MG tablet Take 10 mg by mouth nightly.      magnesium oxide (MAG-OX) 400 mg tablet Take 400 mg by mouth nightly.      metoprolol succinate (TOPROL-XL) 50 MG 24 hr tablet Take 1 tablet (50 mg total) by mouth once daily.  Qty: 30 tablet, Refills: 11      potassium gluconate 595 mg (99 mg) TbSR Take by mouth nightly.      pravastatin (PRAVACHOL) 40 MG tablet Take 40 mg by mouth nightly.      rivaroxaban (XARELTO) 20 mg Tab Take 20 mg by mouth daily with dinner or evening meal.      SITagliptan-metformin (JANUMET) 50-1,000 mg per tablet Take 1 tablet by mouth 2 (two) times daily with meals.           Ifrah Chin NP  Cardiac Electrophysiology  [facility]

## 2018-03-07 NOTE — PLAN OF CARE
Problem: Patient Care Overview  Goal: Plan of Care Review  Outcome: Ongoing (interventions implemented as appropriate)  Plan of care discussed with patient. Patient is free of fall/trauma/injury. Denies CP, SOB, or pain/discomfort. All questions addressed. Will continue to monitor. Patient due to void, bladder scan done this AM around 0630, showed  cc. Patient encouraged to drink more fluids and walk around.

## 2018-03-07 NOTE — ASSESSMENT & PLAN NOTE
S/p RFA-PVI post day #1  Bilateral groin hematomas stable with bruising noted R>L  No void post thrasher d/c about 8 hours ago; bladder scan at 7am with 75 cc  Oral intake encouraged

## 2018-03-07 NOTE — ASSESSMENT & PLAN NOTE
Xarelto dose given with breakfast this morning which patient reports is her biggest meal of the day  Observe for bleeding 1-2 hours post xarelto

## 2018-03-08 LAB — POCT GLUCOSE: 157 MG/DL (ref 70–110)

## 2018-04-27 ENCOUNTER — OFFICE VISIT (OUTPATIENT)
Dept: CARDIOLOGY | Facility: CLINIC | Age: 78
End: 2018-04-27
Payer: MEDICARE

## 2018-04-27 DIAGNOSIS — E78.5 DYSLIPIDEMIA: ICD-10-CM

## 2018-04-27 DIAGNOSIS — Z95.0 CARDIAC PACEMAKER IN SITU: ICD-10-CM

## 2018-04-27 DIAGNOSIS — Z98.890 S/P ABLATION OF ATRIAL FIBRILLATION: ICD-10-CM

## 2018-04-27 DIAGNOSIS — I49.5 SICK SINUS SYNDROME: ICD-10-CM

## 2018-04-27 DIAGNOSIS — Z79.899 LONG TERM CURRENT USE OF ANTIARRHYTHMIC DRUG: ICD-10-CM

## 2018-04-27 DIAGNOSIS — I10 DIABETES MELLITUS WITH COINCIDENT HYPERTENSION: ICD-10-CM

## 2018-04-27 DIAGNOSIS — Z92.89 HISTORY OF CARDIOVERSION: ICD-10-CM

## 2018-04-27 DIAGNOSIS — E11.9 DIABETES MELLITUS WITH COINCIDENT HYPERTENSION: ICD-10-CM

## 2018-04-27 DIAGNOSIS — Z79.01 CURRENT USE OF ANTICOAGULANT THERAPY: ICD-10-CM

## 2018-04-27 DIAGNOSIS — I10 ESSENTIAL HYPERTENSION: ICD-10-CM

## 2018-04-27 DIAGNOSIS — Z86.79 S/P ABLATION OF ATRIAL FIBRILLATION: ICD-10-CM

## 2018-04-27 DIAGNOSIS — I48.19 PERSISTENT ATRIAL FIBRILLATION: Primary | ICD-10-CM

## 2018-04-27 DIAGNOSIS — I47.19 ATRIAL TACHYCARDIA: ICD-10-CM

## 2018-04-27 PROBLEM — I48.91 ATRIAL FIBRILLATION: Status: RESOLVED | Noted: 2018-03-06 | Resolved: 2018-04-27

## 2018-04-27 PROCEDURE — 99214 OFFICE O/P EST MOD 30 MIN: CPT | Mod: ,,, | Performed by: INTERNAL MEDICINE

## 2018-04-27 RX ORDER — METOPROLOL SUCCINATE 25 MG/1
75 TABLET, EXTENDED RELEASE ORAL DAILY
Start: 2018-04-27 | End: 2018-05-15 | Stop reason: SDUPTHER

## 2018-04-27 NOTE — PROGRESS NOTES
Subjective:     [facility]    Cardiologist: Marc Olivares MD    I had the pleasure of seeing Sayda Mccartney in follow-up for her history of atrial arrhythmias. She is a 77 year old female with a history of HTN, HLD, DM2, and sick sinus syndrome status-post [employer] dual chamber pacemaker implantation in 7/2008. Her AF history dates back prior to her pacemaker implantation, when she would have episodes of paroxysmal AF with RVR which would then revert to profound sinus bradycardia. She has been on Sotalol for many years, and has been on 160 mg bid for a number of years. She would have rare episodes lasting minutes up until recently, but has had three cardioversions for persistent AF over the past few months, including two in 1/2018 which were two days apart. Sotalol was changed to Amiodarone after the most recent DCCV. She is in Xarelto for stroke prevention.    At her initial office visit, I reviewed a device check dated 12/19/2017 which showed she had a <1% AF burden since 5/4/2017. Episodes would last on average several minutes, with the longest episode lasting 30 minutes.    Recent cardiac studies include an echo done in 1/2018 which showed an EF of 40% with concentric LVH, and no significant valvular disease.    At her initial office visit, I recommended DCCV after her Amiodarone load was completed. A DCCV was performed, but she reverted to AF within 5 days.    On 3/5/2018, a PVI was perfomed. In addition to PVI, three ATs were successfully targeted during the case (roof AT adjacent to the LSPV, AT arising from the anterior base of the CIARA, mid-septal AT), with ablation of the septal AT restoring sinus rhythm. She was discharged on Amiodarone.     On 4/24/2018, Ms. Mccartney presented to [facility] with dizziness and near syncope, and was found to be in atrial flutter with RVR at 113 bpm. She spontaneously converted to sinus rhythm. Notably, Ms. Mccartney is under a great amount of stress due to her , who is likely going  to placed in hospice in the near future.    I reviewed today's ECG tracing, which shows atrial tachycardia  ms and a ventricular rate of 98 bpm.    Review of Systems   Constitution: Negative for decreased appetite, malaise/fatigue, weight gain and weight loss.   HENT: Negative for sore throat.    Eyes: Negative for blurred vision.   Cardiovascular: Positive for chest pain, dyspnea on exertion and palpitations. Negative for irregular heartbeat, leg swelling, near-syncope, orthopnea, paroxysmal nocturnal dyspnea and syncope.   Respiratory: Negative for shortness of breath.    Skin: Negative for rash.   Musculoskeletal: Negative for arthritis.   Gastrointestinal: Negative for abdominal pain.   Neurological: Negative for focal weakness.   Psychiatric/Behavioral: Negative for altered mental status.        Objective:    Physical Exam   Constitutional: She is oriented to person, place, and time. She appears well-developed and well-nourished. No distress.   HENT:   Head: Normocephalic and atraumatic.   Mouth/Throat: Oropharynx is clear and moist.   Eyes: Conjunctivae are normal. Pupils are equal, round, and reactive to light. No scleral icterus.   Neck: Normal range of motion. Neck supple. No JVD present. No thyromegaly present.   Cardiovascular: Normal heart sounds and intact distal pulses.  An irregularly irregular rhythm present. Tachycardia present.  Exam reveals no gallop and no friction rub.    No murmur heard.  Pulmonary/Chest: Effort normal and breath sounds normal. No respiratory distress.   Abdominal: Soft. Bowel sounds are normal. She exhibits no distension.   Musculoskeletal: She exhibits no edema.   Neurological: She is alert and oriented to person, place, and time.   Skin: Skin is warm and dry.   Psychiatric: She has a normal mood and affect. Her behavior is normal.   Vitals reviewed.        Assessment:       1. Persistent atrial fibrillation    2. S/P ablation of atrial fibrillation    3. History of  cardioversion    4. Sick sinus syndrome    5. Atrial tachycardia    6. Essential hypertension    7. Dyslipidemia    8. Cardiac pacemaker in situ    9. Current use of anticoagulant therapy    10. Diabetes mellitus with coincident hypertension    11. Long term current use of antiarrhythmic drug         Plan:     In summary, Sayda Mccartney is a 77 year old female with a history of sick sinus syndrome, pacemaker implantation, and symptomatic PAF which has recently become persistent. She has failed Sotalol and Amiodarone. She is now status-post PVI, and is currently in rate controlled atrial tachycardia. She is still in the post-procedure blanking period. The plan is to increase Toprol XL to 75 mg daily, and schedule for DCCV in late May, or sooner if her symptoms worsen. She will follow-up with me in 6/2018. If she is still having arrhythmia issues at that point we will discuss the pros and cons of re-do PVI.    Thank you for allowing me to participate in the care of this patient. Please do not hesitate to call me with any questions or concerns.

## 2018-05-15 ENCOUNTER — TELEPHONE (OUTPATIENT)
Dept: ELECTROPHYSIOLOGY | Facility: CLINIC | Age: 78
End: 2018-05-15

## 2018-05-15 RX ORDER — METOPROLOL SUCCINATE 25 MG/1
75 TABLET, EXTENDED RELEASE ORAL DAILY
Qty: 270 TABLET | Refills: 3 | Status: SHIPPED | OUTPATIENT
Start: 2018-05-15 | End: 2019-04-30 | Stop reason: SDUPTHER

## 2018-05-15 NOTE — TELEPHONE ENCOUNTER
Patient called to report increased fatigue and erratic pulse. States pulse in the 110's at times and she can tell she has been out of rhythm. Has pacemaker, and BP has been 130s/80s. Pt not taking metoprolol 75 mg daily as recommended. Discussed with Dr. Henao. Called pt back with instructions to take metoprolol 75 mg daily, and also informed will notify Dr Olivares's office to follow up with cardioversion scheduling. Left message for MARY Cagle with Dr. Olivares. New rx called into [facility]. Pt verbalized understanding and denies any questions/concerns at this time.

## 2018-06-22 ENCOUNTER — OFFICE VISIT (OUTPATIENT)
Dept: CARDIOLOGY | Facility: CLINIC | Age: 78
End: 2018-06-22
Payer: MEDICARE

## 2018-06-22 VITALS — DIASTOLIC BLOOD PRESSURE: 54 MMHG | SYSTOLIC BLOOD PRESSURE: 123 MMHG | HEART RATE: 60 BPM

## 2018-06-22 DIAGNOSIS — I10 DIABETES MELLITUS WITH COINCIDENT HYPERTENSION: ICD-10-CM

## 2018-06-22 DIAGNOSIS — E78.5 DYSLIPIDEMIA: ICD-10-CM

## 2018-06-22 DIAGNOSIS — Z86.79 S/P ABLATION OF ATRIAL FIBRILLATION: ICD-10-CM

## 2018-06-22 DIAGNOSIS — I49.5 SICK SINUS SYNDROME: ICD-10-CM

## 2018-06-22 DIAGNOSIS — I47.19 ATRIAL TACHYCARDIA: ICD-10-CM

## 2018-06-22 DIAGNOSIS — Z79.01 CURRENT USE OF ANTICOAGULANT THERAPY: ICD-10-CM

## 2018-06-22 DIAGNOSIS — I48.19 PERSISTENT ATRIAL FIBRILLATION: Primary | ICD-10-CM

## 2018-06-22 DIAGNOSIS — Z95.0 CARDIAC PACEMAKER IN SITU: ICD-10-CM

## 2018-06-22 DIAGNOSIS — Z98.890 S/P ABLATION OF ATRIAL FIBRILLATION: ICD-10-CM

## 2018-06-22 DIAGNOSIS — Z92.89 HISTORY OF CARDIOVERSION: ICD-10-CM

## 2018-06-22 DIAGNOSIS — Z79.899 LONG TERM CURRENT USE OF ANTIARRHYTHMIC DRUG: ICD-10-CM

## 2018-06-22 DIAGNOSIS — I10 ESSENTIAL HYPERTENSION: ICD-10-CM

## 2018-06-22 DIAGNOSIS — E11.9 DIABETES MELLITUS WITH COINCIDENT HYPERTENSION: ICD-10-CM

## 2018-06-22 PROCEDURE — 99214 OFFICE O/P EST MOD 30 MIN: CPT | Mod: ,,, | Performed by: INTERNAL MEDICINE

## 2018-06-22 NOTE — PROGRESS NOTES
Subjective:     \[facility]\""    Cardiologist: Marc Olivares MD    I had the pleasure of seeing Sayda Mccartney in follow-up for her history of atrial arrhythmias. She is a 78 year old female with a history of HTN, HLD, DM2, and sick sinus syndrome status-post [employer] dual chamber pacemaker implantation in 7/2008. Her AF history dates back prior to her pacemaker implantation, when she would have episodes of paroxysmal AF with RVR which would then revert to profound sinus bradycardia. She has been on Sotalol for many years, and has been on 160 mg bid for a number of years. She would have rare episodes lasting minutes up until recently, but has had three cardioversions for persistent AF over the past few months, including two in 1/2018 which were two days apart. Sotalol was changed to Amiodarone after the most recent DCCV. She is in Xarelto for stroke prevention.    At her initial office visit, I reviewed a device check dated 12/19/2017 which showed she had a <1% AF burden since 5/4/2017. Episodes would last on average several minutes, with the longest episode lasting 30 minutes.    Recent cardiac studies include an echo done in 1/2018 which showed an EF of 40% with concentric LVH, and no significant valvular disease.    At her initial office visit, I recommended DCCV after her Amiodarone load was completed. A DCCV was performed, but she reverted to AF within 5 days.    On 3/5/2018, a PVI was perfomed. In addition to PVI, three ATs were successfully targeted during the case (roof AT adjacent to the LSPV, AT arising from the anterior base of the CIARA, mid-septal AT), with ablation of the septal AT restoring sinus rhythm. She was discharged on Amiodarone.     On 4/24/2018, Ms. Mccartney presented to [facility] with dizziness and near syncope, and was found to be in atrial flutter with RVR at 113 bpm. She spontaneously converted to sinus rhythm. Notably, Ms. Mccartney was under a great amount of stress at the time. At a visit with me  several days later, she was in atrial tachycardia  ms. Toprol XL was increased, and she underwent DCCV on 5/16/2018. A device check performed on 6/19/2018 showed no further AF episodes since her DCCV. She remains on Xarelto and Amiodarone.    Review of Systems   Constitution: Negative for decreased appetite, malaise/fatigue, weight gain and weight loss.   HENT: Negative for sore throat.    Eyes: Negative for blurred vision.   Cardiovascular: Negative for chest pain, dyspnea on exertion, irregular heartbeat, leg swelling, near-syncope, orthopnea, palpitations, paroxysmal nocturnal dyspnea and syncope.   Respiratory: Negative for shortness of breath.    Skin: Negative for rash.   Musculoskeletal: Negative for arthritis.   Gastrointestinal: Negative for abdominal pain.   Neurological: Negative for focal weakness.   Psychiatric/Behavioral: Negative for altered mental status.        Objective:    Physical Exam   Constitutional: She is oriented to person, place, and time. She appears well-developed and well-nourished. No distress.   HENT:   Head: Normocephalic and atraumatic.   Mouth/Throat: Oropharynx is clear and moist.   Eyes: Conjunctivae are normal. Pupils are equal, round, and reactive to light. No scleral icterus.   Neck: Normal range of motion. Neck supple. No JVD present. No thyromegaly present.   Cardiovascular: Normal rate, regular rhythm, normal heart sounds and intact distal pulses.  Exam reveals no gallop and no friction rub.    No murmur heard.  Pulmonary/Chest: Effort normal and breath sounds normal. No respiratory distress.   Abdominal: Soft. Bowel sounds are normal. She exhibits no distension.   Musculoskeletal: She exhibits no edema.   Neurological: She is alert and oriented to person, place, and time.   Skin: Skin is warm and dry.   Psychiatric: She has a normal mood and affect. Her behavior is normal.   Vitals reviewed.        Assessment:       1. Persistent atrial fibrillation    2. S/P  ablation of atrial fibrillation    3. History of cardioversion    4. Sick sinus syndrome    5. Cardiac pacemaker in situ    6. Essential hypertension    7. Dyslipidemia    8. Atrial tachycardia    9. Current use of anticoagulant therapy    10. Diabetes mellitus with coincident hypertension    11. Long term current use of antiarrhythmic drug         Plan:     In summary, Sayda Mccartney is a 78 year old female with a history of sick sinus syndrome, pacemaker implantation, and symptomatic persistent. She has failed Sotalol and Amiodarone. She is now status-post PVI, and had several AF/AT recurrences during the blanking period. She has been maintaining sinus rhythm for roughly 1 month, and is now out of the blanking period. The plan is to hold the course, continue Amiodarone and Xarelto, and see me again in 3 months.    Thank you for allowing me to participate in the care of this patient. Please do not hesitate to call me with any questions or concerns.

## 2018-09-14 ENCOUNTER — OFFICE VISIT (OUTPATIENT)
Dept: CARDIOLOGY | Facility: CLINIC | Age: 78
End: 2018-09-14
Payer: MEDICARE

## 2018-09-14 DIAGNOSIS — Z92.89 HISTORY OF CARDIOVERSION: ICD-10-CM

## 2018-09-14 DIAGNOSIS — Z98.890 S/P ABLATION OF ATRIAL FIBRILLATION: ICD-10-CM

## 2018-09-14 DIAGNOSIS — I10 DIABETES MELLITUS WITH COINCIDENT HYPERTENSION: ICD-10-CM

## 2018-09-14 DIAGNOSIS — Z79.899 LONG TERM CURRENT USE OF ANTIARRHYTHMIC DRUG: ICD-10-CM

## 2018-09-14 DIAGNOSIS — Z95.0 CARDIAC PACEMAKER IN SITU: ICD-10-CM

## 2018-09-14 DIAGNOSIS — I10 ESSENTIAL HYPERTENSION: ICD-10-CM

## 2018-09-14 DIAGNOSIS — Z79.01 CURRENT USE OF ANTICOAGULANT THERAPY: ICD-10-CM

## 2018-09-14 DIAGNOSIS — E11.9 DIABETES MELLITUS WITH COINCIDENT HYPERTENSION: ICD-10-CM

## 2018-09-14 DIAGNOSIS — I49.5 SICK SINUS SYNDROME: ICD-10-CM

## 2018-09-14 DIAGNOSIS — I48.19 PERSISTENT ATRIAL FIBRILLATION: Primary | ICD-10-CM

## 2018-09-14 DIAGNOSIS — I47.19 ATRIAL TACHYCARDIA: ICD-10-CM

## 2018-09-14 DIAGNOSIS — Z86.79 S/P ABLATION OF ATRIAL FIBRILLATION: ICD-10-CM

## 2018-09-14 DIAGNOSIS — E78.5 DYSLIPIDEMIA: ICD-10-CM

## 2018-09-14 PROCEDURE — 99214 OFFICE O/P EST MOD 30 MIN: CPT | Mod: ,,, | Performed by: INTERNAL MEDICINE

## 2018-09-14 NOTE — PROGRESS NOTES
Subjective:     [facility]    Cardiologist: Marc Olivares MD    I had the pleasure of seeing Sayda Mccartney in follow-up for her history of atrial arrhythmias. She is a 78 year old female with a history of HTN, HLD, DM2, and sick sinus syndrome status-post [employer] dual chamber pacemaker implantation in 7/2008. Her AF history dates back prior to her pacemaker implantation, when she would have episodes of paroxysmal AF with RVR which would then revert to profound sinus bradycardia. She has been on Sotalol 160 mg bid for a number of years. She would have rare episodes lasting minutes up until recently, but has had three cardioversions for persistent AF over the past few months, including two in 1/2018 which were two days apart. Sotalol was changed to Amiodarone after the most recent DCCV. She is in Xarelto for stroke prevention.    At her initial office visit, I reviewed a device check dated 12/19/2017 which showed she had a <1% AF burden since 5/4/2017. Episodes would last on average several minutes, with the longest episode lasting 30 minutes.    Recent cardiac studies include an echo done in 1/2018 which showed an EF of 40% with concentric LVH, and no significant valvular disease.    At her initial office visit, I recommended DCCV after her Amiodarone load was completed. A DCCV was performed, but she reverted to AF within 5 days.    On 3/5/2018, a PVI was perfomed. In addition to PVI, three ATs were successfully targeted during the case (roof AT adjacent to the LSPV, AT arising from the anterior base of the CIARA, mid-septal AT), with ablation of the septal AT restoring sinus rhythm. She was discharged on Amiodarone.     On 4/24/2018, Ms. Mccartney presented to [facility] with dizziness and near syncope, and was found to be in atrial flutter with RVR at 113 bpm. She spontaneously converted to sinus rhythm. Notably, Ms. Mccartney was under a great amount of stress at the time. At a visit with me several days later, she was in atrial  tachycardia  ms. Toprol XL was increased, and she underwent DCCV on 5/16/2018. A device check performed on 6/19/2018 showed no further AF episodes since her DCCV. She remained on Xarelto and Amiodarone.    I performed a limited device interrogation in the office today, which shows no AF since her last visit.    Review of Systems   Constitution: Negative for decreased appetite, malaise/fatigue, weight gain and weight loss.   HENT: Negative for sore throat.    Eyes: Negative for blurred vision.   Cardiovascular: Negative for chest pain, dyspnea on exertion, irregular heartbeat, leg swelling, near-syncope, orthopnea, palpitations, paroxysmal nocturnal dyspnea and syncope.   Respiratory: Negative for shortness of breath.    Skin: Negative for rash.   Musculoskeletal: Negative for arthritis.   Gastrointestinal: Negative for abdominal pain.   Neurological: Negative for focal weakness.   Psychiatric/Behavioral: Negative for altered mental status.        Objective:    Physical Exam   Constitutional: She is oriented to person, place, and time. She appears well-developed and well-nourished. No distress.   HENT:   Head: Normocephalic and atraumatic.   Mouth/Throat: Oropharynx is clear and moist.   Eyes: Conjunctivae are normal. Pupils are equal, round, and reactive to light. No scleral icterus.   Neck: Normal range of motion. Neck supple. No JVD present. No thyromegaly present.   Cardiovascular: Normal rate, regular rhythm, normal heart sounds and intact distal pulses. Exam reveals no gallop and no friction rub.   No murmur heard.  Pulmonary/Chest: Effort normal and breath sounds normal. No respiratory distress.   Abdominal: Soft. Bowel sounds are normal. She exhibits no distension.   Musculoskeletal: She exhibits no edema.   Neurological: She is alert and oriented to person, place, and time.   Skin: Skin is warm and dry.   Psychiatric: She has a normal mood and affect. Her behavior is normal.   Vitals reviewed.         Assessment:       1. Persistent atrial fibrillation    2. History of cardioversion    3. Sick sinus syndrome    4. Cardiac pacemaker in situ    5. Essential hypertension    6. S/P ablation of atrial fibrillation    7. Atrial tachycardia    8. Dyslipidemia    9. Long term current use of antiarrhythmic drug    10. Current use of anticoagulant therapy    11. Diabetes mellitus with coincident hypertension         Plan:     In summary, Sayda Mccartney is a 78 year old female with a history of sick sinus syndrome, pacemaker implantation, and symptomatic persistent AF. She failed Sotalol and Amiodarone. She is now status-post PVI, with no AF since the conclusion of the blanking period. The plan is to drop Amiodarone to 100 mg daily. She should continue Xarelto. She will see me again in 6 months.    Thank you for allowing me to participate in the care of this patient. Please do not hesitate to call me with any questions or concerns.

## 2018-11-12 RX ORDER — SITAGLIPTIN AND METFORMIN HYDROCHLORIDE 1000; 50 MG/1; MG/1
TABLET, FILM COATED, EXTENDED RELEASE ORAL
COMMUNITY
Start: 2018-09-04

## 2018-11-23 ENCOUNTER — OFFICE VISIT (OUTPATIENT)
Dept: CARDIOLOGY | Facility: CLINIC | Age: 78
End: 2018-11-23
Payer: MEDICARE

## 2018-11-23 DIAGNOSIS — I49.5 SICK SINUS SYNDROME: ICD-10-CM

## 2018-11-23 DIAGNOSIS — Z79.01 CURRENT USE OF ANTICOAGULANT THERAPY: ICD-10-CM

## 2018-11-23 DIAGNOSIS — Z92.89 HISTORY OF CARDIOVERSION: ICD-10-CM

## 2018-11-23 DIAGNOSIS — E78.5 DYSLIPIDEMIA: ICD-10-CM

## 2018-11-23 DIAGNOSIS — Z79.899 LONG TERM CURRENT USE OF ANTIARRHYTHMIC DRUG: ICD-10-CM

## 2018-11-23 DIAGNOSIS — Z98.890 S/P ABLATION OF ATRIAL FIBRILLATION: ICD-10-CM

## 2018-11-23 DIAGNOSIS — E11.9 DIABETES MELLITUS WITH COINCIDENT HYPERTENSION: ICD-10-CM

## 2018-11-23 DIAGNOSIS — I47.19 ATRIAL TACHYCARDIA: ICD-10-CM

## 2018-11-23 DIAGNOSIS — Z95.0 CARDIAC PACEMAKER IN SITU: ICD-10-CM

## 2018-11-23 DIAGNOSIS — I10 DIABETES MELLITUS WITH COINCIDENT HYPERTENSION: ICD-10-CM

## 2018-11-23 DIAGNOSIS — Z86.79 S/P ABLATION OF ATRIAL FIBRILLATION: ICD-10-CM

## 2018-11-23 DIAGNOSIS — I10 ESSENTIAL HYPERTENSION: ICD-10-CM

## 2018-11-23 DIAGNOSIS — I48.19 PERSISTENT ATRIAL FIBRILLATION: Primary | ICD-10-CM

## 2018-11-23 PROCEDURE — 99214 OFFICE O/P EST MOD 30 MIN: CPT | Mod: ,,, | Performed by: INTERNAL MEDICINE

## 2018-11-23 NOTE — PROGRESS NOTES
Subjective:     [facility]    Cardiologist: Marc Olivares MD    I had the pleasure of seeing Sayda Mccartney in follow-up for her history of atrial arrhythmias. She is a 78 year old female with a history of HTN, HLD, DM2, and sick sinus syndrome status-post [employer] dual chamber pacemaker implantation in 7/2008. Her AF history dates back prior to her pacemaker implantation, when she would have episodes of paroxysmal AF with RVR which would then revert to profound sinus bradycardia. She has been on Sotalol 160 mg bid for a number of years. She would have rare episodes lasting minutes up until recently, but has had three cardioversions for persistent AF over the past few months, including two in 1/2018 which were two days apart. Sotalol was changed to Amiodarone after the most recent DCCV. She is in Xarelto for stroke prevention.    At her initial office visit, I reviewed a device check dated 12/19/2017 which showed she had a <1% AF burden since 5/4/2017. Episodes would last on average several minutes, with the longest episode lasting 30 minutes.    Recent cardiac studies include an echo done in 1/2018 which showed an EF of 40% with concentric LVH, and no significant valvular disease.    At her initial office visit, I recommended DCCV after her Amiodarone load was completed. A DCCV was performed, but she reverted to AF within 5 days.    On 3/5/2018, a PVI was perfomed. In addition to PVI, three ATs were successfully targeted during the case (roof AT adjacent to the LSPV, AT arising from the anterior base of the CIARA, mid-septal AT), with ablation of the septal AT restoring sinus rhythm. She was discharged on Amiodarone.     On 4/24/2018, Ms. Mccartney presented to [facility] with dizziness and near syncope, and was found to be in atrial flutter with RVR at 113 bpm. She spontaneously converted to sinus rhythm. Notably, Ms. Mccartney was under a great amount of stress at the time. At a visit with me several days later, she was in atrial  tachycardia  ms. Toprol XL was increased, and she underwent DCCV on 5/16/2018. A device check performed on 6/19/2018 showed no further AF episodes since her DCCV. She remained on Xarelto and Amiodarone. At her 9/2018 visit, a limited device interrogation showed no AF since her last visit. Amiodarone was dropped to 100 mg daily. On 11/2/2018, Ms. Mccartney presented to [facility] in [place], and was cardioverted. She is back on Amiodarone 200 mg daily.    I reviewed her presenting ECG which showed atrial flutter (atypical) at 137 bpm.    My interpretation of today's ECG is NSR at 60 bpm.    Review of Systems   Constitution: Negative for decreased appetite, malaise/fatigue, weight gain and weight loss.   HENT: Negative for sore throat.    Eyes: Negative for blurred vision.   Cardiovascular: Negative for chest pain, dyspnea on exertion, irregular heartbeat, leg swelling, near-syncope, orthopnea, palpitations, paroxysmal nocturnal dyspnea and syncope.   Respiratory: Negative for shortness of breath.    Skin: Negative for rash.   Musculoskeletal: Negative for arthritis.   Gastrointestinal: Negative for abdominal pain.   Neurological: Negative for focal weakness.   Psychiatric/Behavioral: Negative for altered mental status.        Objective:    Physical Exam   Constitutional: She is oriented to person, place, and time. She appears well-developed and well-nourished. No distress.   HENT:   Head: Normocephalic and atraumatic.   Mouth/Throat: Oropharynx is clear and moist.   Eyes: Conjunctivae are normal. Pupils are equal, round, and reactive to light. No scleral icterus.   Neck: Normal range of motion. Neck supple. No JVD present. No thyromegaly present.   Cardiovascular: Normal rate, regular rhythm, normal heart sounds and intact distal pulses. Exam reveals no gallop and no friction rub.   No murmur heard.  Pulmonary/Chest: Effort normal and breath sounds normal. No respiratory distress.   Abdominal: Soft. Bowel sounds are  normal. She exhibits no distension.   Musculoskeletal: She exhibits no edema.   Neurological: She is alert and oriented to person, place, and time.   Skin: Skin is warm and dry.   Psychiatric: She has a normal mood and affect. Her behavior is normal.   Vitals reviewed.        Assessment:       1. Persistent atrial fibrillation    2. Dyslipidemia    3. Atrial tachycardia    4. S/P ablation of atrial fibrillation    5. Cardiac pacemaker in situ    6. Sick sinus syndrome    7. History of cardioversion    8. Essential hypertension    9. Current use of anticoagulant therapy    10. Diabetes mellitus with coincident hypertension    11. Long term current use of antiarrhythmic drug         Plan:     In summary, Sayda Mccartney is a 78 year old female with a history of sick sinus syndrome, pacemaker implantation, and symptomatic persistent AF. She failed Sotalol and Amiodarone. She is now status-post PVI, with a single episode of AF in 11/2018. The plan is to continue Amiodarone 200 mg daily. We briefly discussed re-do PVI, but decided that continuing antiarrhythmics is the best option for her at this time.    She will see me again in 3 months.    Thank you for allowing me to participate in the care of this patient. Please do not hesitate to call me with any questions or concerns.

## 2019-02-13 NOTE — PROGRESS NOTES
Subjective:     [facility]    Cardiologist: Marc Olivares MD    I had the pleasure of seeing Sayda Mccartney in follow-up for her history of atrial arrhythmias. She is a 78 year old female with a history of HTN, HLD, DM2, and sick sinus syndrome status-post [employer] dual chamber pacemaker implantation in 7/2008. Her AF history dates back prior to her pacemaker implantation, when she would have episodes of paroxysmal AF with RVR which would then revert to profound sinus bradycardia. She had been on Sotalol 160 mg bid for a number of years. She would have rare episodes lasting minutes up until recently, but then had three cardioversions for persistent AF, including two in 1/2018 which were two days apart. Sotalol was changed to Amiodarone after the last DCCV. She is in Xarelto for stroke prevention.    At her initial office visit, I reviewed a device check dated 12/19/2017 which showed she had a <1% AF burden since 5/4/2017. Episodes would last on average several minutes, with the longest episode lasting 30 minutes.    Recent cardiac studies include an echo done in 1/2018 which showed an EF of 40% with concentric LVH, and no significant valvular disease.    At her initial office visit, I recommended DCCV after her Amiodarone load was completed. A DCCV was performed, but she reverted to AF within 5 days.    On 3/5/2018, a PVI was perfomed. In addition to PVI, three ATs were successfully targeted during the case (roof AT adjacent to the LSPV, AT arising from the anterior base of the CIARA, mid-septal AT), with ablation of the septal AT restoring sinus rhythm. She was discharged on Amiodarone.     On 4/24/2018, Ms. Mccartney presented to [facility] with dizziness and near syncope, and was found to be in atrial flutter with RVR at 113 bpm. She spontaneously converted to sinus rhythm. Notably, Ms. Mccartney was under a great amount of stress at the time. At a visit with me several days later, she was in atrial tachycardia  msFrances Toprol  XL was increased, and she underwent DCCV on 5/16/2018. A device check performed on 6/19/2018 showed no further AF episodes since her DCCV. She remained on Xarelto and Amiodarone. At her 9/2018 visit, a limited device interrogation showed no AF since her last visit. Amiodarone was dropped to 100 mg daily. On 11/2/2018, Ms. Mccartney presented to [facility] in [place], and was cardioverted. Amiodarone was increased back to 200 mg daily. I reviewed her presenting ECG which showed atrial flutter (atypical) at 137 bpm. At her last visit in 11/2018, we discussed options including re-do PVI vs holding the course. She wanted to hold the course at that time.    In 1/2019, Ms. Mccartney presented to [facility] with palpitations and chest pain, and was found to be in atrial tachycardia at 116 bpm. She spontaneously reverted to sinus rhythm. She is currently on Amiodarone 400 mg bid. Three days following discharge she had another episode of AF lasting 1 day.     My interpretation of today's ECG is atrial pacing at 60 bpm.    Review of Systems   Constitution: Negative for decreased appetite, malaise/fatigue, weight gain and weight loss.   HENT: Negative for sore throat.    Eyes: Negative for blurred vision.   Cardiovascular: Negative for chest pain, dyspnea on exertion, irregular heartbeat, leg swelling, near-syncope, orthopnea, palpitations, paroxysmal nocturnal dyspnea and syncope.   Respiratory: Negative for shortness of breath.    Skin: Negative for rash.   Musculoskeletal: Negative for arthritis.   Gastrointestinal: Negative for abdominal pain.   Neurological: Negative for focal weakness.   Psychiatric/Behavioral: Negative for altered mental status.        Objective:    Physical Exam   Constitutional: She is oriented to person, place, and time. She appears well-developed and well-nourished. No distress.   HENT:   Head: Normocephalic and atraumatic.   Mouth/Throat: Oropharynx is clear and moist.   Eyes: Conjunctivae are normal. Pupils are  equal, round, and reactive to light. No scleral icterus.   Neck: Normal range of motion. Neck supple. No JVD present. No thyromegaly present.   Cardiovascular: Normal rate, regular rhythm, normal heart sounds and intact distal pulses. Exam reveals no gallop and no friction rub.   No murmur heard.  Pulmonary/Chest: Effort normal and breath sounds normal. No respiratory distress.   Abdominal: Soft. Bowel sounds are normal. She exhibits no distension.   Musculoskeletal: She exhibits no edema.   Neurological: She is alert and oriented to person, place, and time.   Skin: Skin is warm and dry.   Psychiatric: She has a normal mood and affect. Her behavior is normal.   Vitals reviewed.        Assessment:       1. Persistent atrial fibrillation    2. History of cardioversion    3. Sick sinus syndrome    4. Cardiac pacemaker in situ    5. Essential hypertension    6. Dyslipidemia    7. S/P ablation of atrial fibrillation    8. Atrial tachycardia    9. Current use of anticoagulant therapy    10. Diabetes mellitus with coincident hypertension    11. Long term current use of antiarrhythmic drug         Plan:     In summary, Sayda Mccartney is a 78 year old female with a history of sick sinus syndrome, pacemaker implantation, and symptomatic persistent AF. She failed Sotalol and Amiodarone. She is now status-post PVI, and is having recurrent paroxysms of AF on Amiodarone.    We discussed in detail the pathophysiology of AF as well as the myriad of treatment options available to manage it including alternative antiarrhythmics and catheter ablation. We specifically discussed the risks, benefits, indications, and alternatives to re-do PVI. Risks discussed include bleeding, hematoma, vascular damage, cardiac tamponade, stroke, PV stenosis, AE fistula, phrenic nerve damage, and death.  After considering his options he has decided to proceed.     CARTO. BRYAN AM of procedure--cancel if sinus. Drop Amiodarone to 400 mg daily starting  today, and hold Amiodarone 1 week prior to procedure. Hold Xarelto evening prior to procedure. No re-do CT scan necessary, but order one if it was not done prior to the first PVI.    Thank you for allowing me to participate in the care of this patient. Please do not hesitate to call me with any questions or concerns.

## 2019-02-14 RX ORDER — LEVOTHYROXINE SODIUM 75 UG/1
75 TABLET ORAL DAILY
COMMUNITY

## 2019-02-15 ENCOUNTER — OFFICE VISIT (OUTPATIENT)
Dept: CARDIOLOGY | Facility: CLINIC | Age: 79
End: 2019-02-15
Payer: MEDICARE

## 2019-02-15 DIAGNOSIS — I10 ESSENTIAL HYPERTENSION: ICD-10-CM

## 2019-02-15 DIAGNOSIS — E11.9 DIABETES MELLITUS WITH COINCIDENT HYPERTENSION: ICD-10-CM

## 2019-02-15 DIAGNOSIS — Z79.899 LONG TERM CURRENT USE OF ANTIARRHYTHMIC DRUG: ICD-10-CM

## 2019-02-15 DIAGNOSIS — I10 DIABETES MELLITUS WITH COINCIDENT HYPERTENSION: ICD-10-CM

## 2019-02-15 DIAGNOSIS — Z92.89 HISTORY OF CARDIOVERSION: ICD-10-CM

## 2019-02-15 DIAGNOSIS — E78.5 DYSLIPIDEMIA: ICD-10-CM

## 2019-02-15 DIAGNOSIS — Z98.890 S/P ABLATION OF ATRIAL FIBRILLATION: ICD-10-CM

## 2019-02-15 DIAGNOSIS — Z95.0 CARDIAC PACEMAKER IN SITU: ICD-10-CM

## 2019-02-15 DIAGNOSIS — I49.5 SICK SINUS SYNDROME: ICD-10-CM

## 2019-02-15 DIAGNOSIS — Z86.79 S/P ABLATION OF ATRIAL FIBRILLATION: ICD-10-CM

## 2019-02-15 DIAGNOSIS — I48.19 PERSISTENT ATRIAL FIBRILLATION: Primary | ICD-10-CM

## 2019-02-15 DIAGNOSIS — I47.19 ATRIAL TACHYCARDIA: ICD-10-CM

## 2019-02-15 DIAGNOSIS — Z79.01 CURRENT USE OF ANTICOAGULANT THERAPY: ICD-10-CM

## 2019-02-15 PROCEDURE — 99215 OFFICE O/P EST HI 40 MIN: CPT | Mod: ,,, | Performed by: INTERNAL MEDICINE

## 2019-02-15 PROCEDURE — 99215 PR OFFICE/OUTPT VISIT, EST, LEVL V, 40-54 MIN: ICD-10-PCS | Mod: ,,, | Performed by: INTERNAL MEDICINE

## 2019-03-19 DIAGNOSIS — I48.19 PERSISTENT ATRIAL FIBRILLATION: Primary | ICD-10-CM

## 2019-03-19 NOTE — PROGRESS NOTES
ABLATION EDUCATION CHECKLIST      PRE-PROCEDURE TESTING:      3/25/19 - LAST DOSE OF AMIODARONE      3/26/19 @ 10 AM - LAB WORK  Pre-Procedure labs have been ordered for you at:  [facility]   · Be sure to arrive at your scheduled time.   · YOU DO NOT HAVE TO FAST FOR THIS LAB WORK!    4/01/19 @ 12NOON - TRANSESOPHAGEAL ECHO   Report to the Cardiology Waiting Area on the 3rd floor of the hospital      You may not have anything to eat or drink after midnight the night before your test.     Medications:  You may take your regular morning medications with water.     You will be discharged home that same afternoon. You must have someone to drive you home.  Your doctor will determine, based on your progress, the time of your discharge. The results of your procedure will be discussed with you before you are discharged.      You will be contacted by the echo department prior to your procedure for a  pre-procedure questionnaire.     Any need to reschedule or cancel procedures, or any questions regarding your procedures should be addressed directly with the Arrhythmia Department Nurses at the following phone number: [phone].    Directions to Cardiology Waiting Area:  See below      DAY OF PROCEDURE:    4/02/19 @ 9 AM - ABLATION  Report to Cardiology Waiting Room on 3rd floor of the Hospital    · Do not eat or drink anything after: 12 mn on the night before your procedure  · Please do not wear makeup (especially mascara) when arriving for your procedure    Medications:   · HOLD AMIODARONE 7 DAYS PRIOR TO PROCEDURE. LAST DOSE 3/25/19  · MORNING OF PROCEDURE DO NOT TAKE JANUMET, GLUCOTROL OR XARELTO  · You may take all other morning medications with a sip of water      Directions to the Cardiology Waiting Room  If you park in the Parking Garage:  Take elevators to the 2nd floor  Walk up ramp and turn right by Gold Elevators  Take elevator to the 3rd floor  Upon exiting the elevator, turn  away from the clinic areas  Walk long phillips around to front of hospital to area with [facility]  Check in at Reception Desk  OR  If family is dropping you off:  Have them drop you off at the front of the Hospital  (Near the ER, where all the flags are hung).  Take the E elevators to the 3rd floor.  Check in at the Reception Desk in the waiting room.        · You will be spending the night after your procedure.  · You will need someone to drive you home the day after your procedure.  · Your pain during your procedure will be managed by the anesthesia team.     Any need to reschedule or cancel procedures, or any questions regarding your procedures should be addressed directly with the Arrhythmia Department Nurses at the following phone number: [phone]

## 2019-03-20 ENCOUNTER — TELEPHONE (OUTPATIENT)
Dept: ELECTROPHYSIOLOGY | Facility: CLINIC | Age: 79
End: 2019-03-20

## 2019-03-20 NOTE — TELEPHONE ENCOUNTER
Returned call to pt. Informed that instructions for her procedure were put in the mail yesterday. Advised that lab orders have been sent to [facility] she jsut has to go to the lab on the scheduled date. Pt voiced understanding.        ----- Message from Phyllis Reyez sent at 3/20/2019  8:59 AM CDT -----  Contact: Pateint  The pt is calling to see id her paper was in the mail for her to get her blood work done in [place]. Please call her back @ [phone]. Thanks,Phyllis

## 2019-03-29 ENCOUNTER — TELEPHONE (OUTPATIENT)
Dept: ELECTROPHYSIOLOGY | Facility: CLINIC | Age: 79
End: 2019-03-29

## 2019-03-29 NOTE — TELEPHONE ENCOUNTER
Called pt to confirm BRYAN on Monday and ablation on Tuesday.  Spoke with Pt and reviewed pre op instructions including: arrival on Monday at 12 n and Tuesday for 9am, NPO after MN for both procedures, No janumet or glucotrol morning of proc but okay to take other meds with small sip of water and verfied amiodarone had been held since 3/25/19.  Pt voice understanding and stated she has her instructions.

## 2019-04-01 ENCOUNTER — HOSPITAL ENCOUNTER (OUTPATIENT)
Facility: HOSPITAL | Age: 79
Discharge: HOME | End: 2019-04-01
Attending: INTERNAL MEDICINE | Admitting: INTERNAL MEDICINE
Payer: MEDICARE

## 2019-04-01 DIAGNOSIS — I48.91 ATRIAL FIBRILLATION: ICD-10-CM

## 2019-04-01 PROCEDURE — 93010 EKG 12-LEAD: ICD-10-PCS | Mod: ,,, | Performed by: INTERNAL MEDICINE

## 2019-04-01 PROCEDURE — 93005 ELECTROCARDIOGRAM TRACING: CPT

## 2019-04-01 PROCEDURE — 93010 ELECTROCARDIOGRAM REPORT: CPT | Mod: ,,, | Performed by: INTERNAL MEDICINE

## 2019-04-01 RX ORDER — SODIUM CHLORIDE 0.9 % (FLUSH) 0.9 %
5 SYRINGE (ML) INJECTION
Status: DISCONTINUED | OUTPATIENT
Start: 2019-04-01 | End: 2019-04-01 | Stop reason: HOSPADM

## 2019-04-01 RX ORDER — FENTANYL CITRATE 50 UG/ML
25 INJECTION, SOLUTION INTRAMUSCULAR; INTRAVENOUS EVERY 5 MIN PRN
Status: CANCELLED | OUTPATIENT
Start: 2019-04-01

## 2019-04-01 RX ORDER — SODIUM CHLORIDE 0.9 % (FLUSH) 0.9 %
10 SYRINGE (ML) INJECTION
Status: CANCELLED | OUTPATIENT
Start: 2019-04-01

## 2019-04-02 ENCOUNTER — ANESTHESIA EVENT (OUTPATIENT)
Dept: MEDSURG UNIT | Facility: HOSPITAL | Age: 79
End: 2019-04-02
Payer: MEDICARE

## 2019-04-02 ENCOUNTER — ANESTHESIA (OUTPATIENT)
Dept: MEDSURG UNIT | Facility: HOSPITAL | Age: 79
End: 2019-04-02
Payer: MEDICARE

## 2019-04-02 ENCOUNTER — HOSPITAL ENCOUNTER (OUTPATIENT)
Facility: HOSPITAL | Age: 79
Discharge: HOME | End: 2019-04-03
Attending: INTERNAL MEDICINE | Admitting: INTERNAL MEDICINE
Payer: MEDICARE

## 2019-04-02 DIAGNOSIS — I48.91 ATRIAL FIBRILLATION: ICD-10-CM

## 2019-04-02 DIAGNOSIS — I48.91 A-FIB: ICD-10-CM

## 2019-04-02 DIAGNOSIS — I48.19 PERSISTENT ATRIAL FIBRILLATION: Primary | ICD-10-CM

## 2019-04-02 LAB
ABO + RH BLD: NORMAL
BLD GP AB SCN CELLS X3 SERPL QL: NORMAL
POCT GLUCOSE: 108 MG/DL (ref 70–110)
POCT GLUCOSE: 157 MG/DL (ref 70–110)
POCT GLUCOSE: 166 MG/DL (ref 70–110)

## 2019-04-02 PROCEDURE — 93623 PRGRMD STIMJ&PACG IV RX NFS: CPT | Performed by: INTERNAL MEDICINE

## 2019-04-02 PROCEDURE — 93010 EKG 12-LEAD: ICD-10-PCS | Mod: 76,,, | Performed by: INTERNAL MEDICINE

## 2019-04-02 PROCEDURE — 63600175 PHARM REV CODE 636 W HCPCS: Performed by: ANESTHESIOLOGY

## 2019-04-02 PROCEDURE — 93623 PR STIM/PACING HEART POST IV DRUG INFU: ICD-10-PCS | Mod: 26,ICN,, | Performed by: INTERNAL MEDICINE

## 2019-04-02 PROCEDURE — D9220A PRA ANESTHESIA: ICD-10-PCS | Mod: CRNA,,, | Performed by: NURSE ANESTHETIST, CERTIFIED REGISTERED

## 2019-04-02 PROCEDURE — C1766 INTRO/SHEATH,STRBLE,NON-PEEL: HCPCS | Performed by: INTERNAL MEDICINE

## 2019-04-02 PROCEDURE — 25000003 PHARM REV CODE 250: Performed by: INTERNAL MEDICINE

## 2019-04-02 PROCEDURE — 93662 INTRACARDIAC ECG (ICE): CPT | Performed by: INTERNAL MEDICINE

## 2019-04-02 PROCEDURE — 36620 PR INSERT CATH,ART,PERCUT,SHORTTERM: ICD-10-PCS | Mod: 59,,, | Performed by: ANESTHESIOLOGY

## 2019-04-02 PROCEDURE — 93623 PRGRMD STIMJ&PACG IV RX NFS: CPT | Mod: 26,ICN,, | Performed by: INTERNAL MEDICINE

## 2019-04-02 PROCEDURE — D9220A PRA ANESTHESIA: ICD-10-PCS | Mod: ANES,,, | Performed by: ANESTHESIOLOGY

## 2019-04-02 PROCEDURE — 93655 ICAR CATH ABLTJ DSCRT ARRHYT: CPT | Mod: ICN,,, | Performed by: INTERNAL MEDICINE

## 2019-04-02 PROCEDURE — 93613 PR INTRACARD ELECTROPHYS 3-DIMENS MAPPING: ICD-10-PCS | Mod: ICN,,, | Performed by: INTERNAL MEDICINE

## 2019-04-02 PROCEDURE — 93662 PR INTRACARD ECHO, THER/DX INTERVENT: ICD-10-PCS | Mod: 26,ICN,, | Performed by: INTERNAL MEDICINE

## 2019-04-02 PROCEDURE — 93613 INTRACARDIAC EPHYS 3D MAPG: CPT | Mod: ICN,,, | Performed by: INTERNAL MEDICINE

## 2019-04-02 PROCEDURE — 93010 ELECTROCARDIOGRAM REPORT: CPT | Mod: ,,, | Performed by: INTERNAL MEDICINE

## 2019-04-02 PROCEDURE — 27201423 OPTIME MED/SURG SUP & DEVICES STERILE SUPPLY: Performed by: INTERNAL MEDICINE

## 2019-04-02 PROCEDURE — D9220A PRA ANESTHESIA: Mod: CRNA,,, | Performed by: NURSE ANESTHETIST, CERTIFIED REGISTERED

## 2019-04-02 PROCEDURE — 25000003 PHARM REV CODE 250: Performed by: NURSE PRACTITIONER

## 2019-04-02 PROCEDURE — 63600175 PHARM REV CODE 636 W HCPCS: Performed by: NURSE ANESTHETIST, CERTIFIED REGISTERED

## 2019-04-02 PROCEDURE — C1730 CATH, EP, 19 OR FEW ELECT: HCPCS | Performed by: INTERNAL MEDICINE

## 2019-04-02 PROCEDURE — 82962 GLUCOSE BLOOD TEST: CPT | Performed by: INTERNAL MEDICINE

## 2019-04-02 PROCEDURE — 82962 GLUCOSE BLOOD TEST: CPT | Mod: 91

## 2019-04-02 PROCEDURE — 93010 ELECTROCARDIOGRAM REPORT: CPT | Mod: 76,,, | Performed by: INTERNAL MEDICINE

## 2019-04-02 PROCEDURE — 37000009 HC ANESTHESIA EA ADD 15 MINS: Performed by: INTERNAL MEDICINE

## 2019-04-02 PROCEDURE — 93656 COMPRE EP EVAL ABLTJ ATR FIB: CPT | Mod: ICN,,, | Performed by: INTERNAL MEDICINE

## 2019-04-02 PROCEDURE — 25000003 PHARM REV CODE 250: Performed by: NURSE ANESTHETIST, CERTIFIED REGISTERED

## 2019-04-02 PROCEDURE — D9220A PRA ANESTHESIA: Mod: ANES,,, | Performed by: ANESTHESIOLOGY

## 2019-04-02 PROCEDURE — 36620 INSERTION CATHETER ARTERY: CPT | Mod: 59,,, | Performed by: ANESTHESIOLOGY

## 2019-04-02 PROCEDURE — 27201037 HC PRESSURE MONITORING SET UP

## 2019-04-02 PROCEDURE — 93005 ELECTROCARDIOGRAM TRACING: CPT

## 2019-04-02 PROCEDURE — 93662 INTRACARDIAC ECG (ICE): CPT | Mod: 26,ICN,, | Performed by: INTERNAL MEDICINE

## 2019-04-02 PROCEDURE — 93655 ICAR CATH ABLTJ DSCRT ARRHYT: CPT | Performed by: INTERNAL MEDICINE

## 2019-04-02 PROCEDURE — C1894 INTRO/SHEATH, NON-LASER: HCPCS | Performed by: INTERNAL MEDICINE

## 2019-04-02 PROCEDURE — 63600175 PHARM REV CODE 636 W HCPCS

## 2019-04-02 PROCEDURE — 93656 PR ELECTROPHYS EVAL, COMPREHEN, W/ABLATION OF ATRIAL FIBR: ICD-10-PCS | Mod: ICN,,, | Performed by: INTERNAL MEDICINE

## 2019-04-02 PROCEDURE — 86901 BLOOD TYPING SEROLOGIC RH(D): CPT

## 2019-04-02 PROCEDURE — 93005 ELECTROCARDIOGRAM TRACING: CPT | Mod: 59

## 2019-04-02 PROCEDURE — 37000008 HC ANESTHESIA 1ST 15 MINUTES: Performed by: INTERNAL MEDICINE

## 2019-04-02 PROCEDURE — C1732 CATH, EP, DIAG/ABL, 3D/VECT: HCPCS | Performed by: INTERNAL MEDICINE

## 2019-04-02 PROCEDURE — 93613 INTRACARDIAC EPHYS 3D MAPG: CPT | Performed by: INTERNAL MEDICINE

## 2019-04-02 PROCEDURE — C1753 CATH, INTRAVAS ULTRASOUND: HCPCS | Performed by: INTERNAL MEDICINE

## 2019-04-02 PROCEDURE — 93656 COMPRE EP EVAL ABLTJ ATR FIB: CPT | Performed by: INTERNAL MEDICINE

## 2019-04-02 PROCEDURE — 93655 PR ABLATE ARRHYTHMIA ADD ON: ICD-10-PCS | Mod: ICN,,, | Performed by: INTERNAL MEDICINE

## 2019-04-02 RX ORDER — FENTANYL CITRATE 50 UG/ML
25 INJECTION, SOLUTION INTRAMUSCULAR; INTRAVENOUS EVERY 5 MIN PRN
Status: DISCONTINUED | OUTPATIENT
Start: 2019-04-02 | End: 2019-04-03 | Stop reason: HOSPADM

## 2019-04-02 RX ORDER — LEVOTHYROXINE SODIUM 75 UG/1
75 TABLET ORAL
Status: DISCONTINUED | OUTPATIENT
Start: 2019-04-03 | End: 2019-04-03 | Stop reason: HOSPADM

## 2019-04-02 RX ORDER — GLUCAGON 1 MG
1 KIT INJECTION
Status: DISCONTINUED | OUTPATIENT
Start: 2019-04-02 | End: 2019-04-03 | Stop reason: HOSPADM

## 2019-04-02 RX ORDER — SUCCINYLCHOLINE CHLORIDE 20 MG/ML
INJECTION INTRAMUSCULAR; INTRAVENOUS
Status: DISCONTINUED | OUTPATIENT
Start: 2019-04-02 | End: 2019-04-02

## 2019-04-02 RX ORDER — LIDOCAINE HYDROCHLORIDE 20 MG/ML
INJECTION, SOLUTION INFILTRATION; PERINEURAL
Status: DISCONTINUED | OUTPATIENT
Start: 2019-04-02 | End: 2019-04-03 | Stop reason: HOSPADM

## 2019-04-02 RX ORDER — PROPOFOL 10 MG/ML
VIAL (ML) INTRAVENOUS
Status: DISCONTINUED | OUTPATIENT
Start: 2019-04-02 | End: 2019-04-02

## 2019-04-02 RX ORDER — MIDAZOLAM HYDROCHLORIDE 1 MG/ML
INJECTION, SOLUTION INTRAMUSCULAR; INTRAVENOUS
Status: DISCONTINUED | OUTPATIENT
Start: 2019-04-02 | End: 2019-04-02

## 2019-04-02 RX ORDER — ONDANSETRON 2 MG/ML
INJECTION INTRAMUSCULAR; INTRAVENOUS
Status: DISCONTINUED | OUTPATIENT
Start: 2019-04-02 | End: 2019-04-02

## 2019-04-02 RX ORDER — HEPARIN SODIUM 1000 [USP'U]/ML
INJECTION, SOLUTION INTRAVENOUS; SUBCUTANEOUS
Status: DISCONTINUED | OUTPATIENT
Start: 2019-04-02 | End: 2019-04-02

## 2019-04-02 RX ORDER — SODIUM CHLORIDE 0.9 % (FLUSH) 0.9 %
10 SYRINGE (ML) INJECTION
Status: DISCONTINUED | OUTPATIENT
Start: 2019-04-02 | End: 2019-04-03 | Stop reason: HOSPADM

## 2019-04-02 RX ORDER — IBUPROFEN 200 MG
24 TABLET ORAL
Status: DISCONTINUED | OUTPATIENT
Start: 2019-04-02 | End: 2019-04-03 | Stop reason: HOSPADM

## 2019-04-02 RX ORDER — CEFAZOLIN SODIUM 1 G/3ML
INJECTION, POWDER, FOR SOLUTION INTRAMUSCULAR; INTRAVENOUS
Status: DISCONTINUED | OUTPATIENT
Start: 2019-04-02 | End: 2019-04-02

## 2019-04-02 RX ORDER — FUROSEMIDE 10 MG/ML
INJECTION INTRAMUSCULAR; INTRAVENOUS
Status: DISCONTINUED | OUTPATIENT
Start: 2019-04-02 | End: 2019-04-02

## 2019-04-02 RX ORDER — PROTAMINE SULFATE 10 MG/ML
INJECTION, SOLUTION INTRAVENOUS
Status: DISCONTINUED | OUTPATIENT
Start: 2019-04-02 | End: 2019-04-02

## 2019-04-02 RX ORDER — AMIODARONE HYDROCHLORIDE 200 MG/1
200 TABLET ORAL DAILY
Status: DISCONTINUED | OUTPATIENT
Start: 2019-04-02 | End: 2019-04-03 | Stop reason: HOSPADM

## 2019-04-02 RX ORDER — INSULIN ASPART 100 [IU]/ML
0-5 INJECTION, SOLUTION INTRAVENOUS; SUBCUTANEOUS
Status: DISCONTINUED | OUTPATIENT
Start: 2019-04-02 | End: 2019-04-03 | Stop reason: HOSPADM

## 2019-04-02 RX ORDER — LIDOCAINE HCL/PF 100 MG/5ML
SYRINGE (ML) INTRAVENOUS
Status: DISCONTINUED | OUTPATIENT
Start: 2019-04-02 | End: 2019-04-02

## 2019-04-02 RX ORDER — ONDANSETRON 2 MG/ML
4 INJECTION INTRAMUSCULAR; INTRAVENOUS ONCE
Status: COMPLETED | OUTPATIENT
Start: 2019-04-02 | End: 2019-04-02

## 2019-04-02 RX ORDER — PANTOPRAZOLE SODIUM 40 MG/1
40 TABLET, DELAYED RELEASE ORAL DAILY
Status: DISCONTINUED | OUTPATIENT
Start: 2019-04-03 | End: 2019-04-03 | Stop reason: HOSPADM

## 2019-04-02 RX ORDER — SODIUM CHLORIDE 9 MG/ML
INJECTION, SOLUTION INTRAVENOUS CONTINUOUS
Status: DISCONTINUED | OUTPATIENT
Start: 2019-04-02 | End: 2019-04-03 | Stop reason: HOSPADM

## 2019-04-02 RX ORDER — ONDANSETRON 2 MG/ML
INJECTION INTRAMUSCULAR; INTRAVENOUS
Status: COMPLETED
Start: 2019-04-02 | End: 2019-04-02

## 2019-04-02 RX ORDER — ROCURONIUM BROMIDE 10 MG/ML
INJECTION, SOLUTION INTRAVENOUS
Status: DISCONTINUED | OUTPATIENT
Start: 2019-04-02 | End: 2019-04-02

## 2019-04-02 RX ORDER — CEFAZOLIN SODIUM 1 G/3ML
2 INJECTION, POWDER, FOR SOLUTION INTRAMUSCULAR; INTRAVENOUS
Status: DISCONTINUED | OUTPATIENT
Start: 2019-04-02 | End: 2019-04-02 | Stop reason: HOSPADM

## 2019-04-02 RX ORDER — FENTANYL CITRATE 50 UG/ML
INJECTION, SOLUTION INTRAMUSCULAR; INTRAVENOUS
Status: DISCONTINUED | OUTPATIENT
Start: 2019-04-02 | End: 2019-04-02

## 2019-04-02 RX ORDER — PRAVASTATIN SODIUM 40 MG/1
40 TABLET ORAL NIGHTLY
Status: DISCONTINUED | OUTPATIENT
Start: 2019-04-02 | End: 2019-04-03 | Stop reason: HOSPADM

## 2019-04-02 RX ORDER — AMLODIPINE BESYLATE 10 MG/1
10 TABLET ORAL DAILY
Status: DISCONTINUED | OUTPATIENT
Start: 2019-04-03 | End: 2019-04-03 | Stop reason: HOSPADM

## 2019-04-02 RX ORDER — ASPIRIN 81 MG/1
81 TABLET ORAL NIGHTLY
Status: DISCONTINUED | OUTPATIENT
Start: 2019-04-02 | End: 2019-04-03 | Stop reason: HOSPADM

## 2019-04-02 RX ORDER — IBUPROFEN 200 MG
16 TABLET ORAL
Status: DISCONTINUED | OUTPATIENT
Start: 2019-04-02 | End: 2019-04-03 | Stop reason: HOSPADM

## 2019-04-02 RX ORDER — HEPARIN SODIUM 10000 [USP'U]/100ML
INJECTION, SOLUTION INTRAVENOUS CONTINUOUS PRN
Status: DISCONTINUED | OUTPATIENT
Start: 2019-04-02 | End: 2019-04-02

## 2019-04-02 RX ORDER — DIPHENHYDRAMINE HYDROCHLORIDE 50 MG/ML
25 INJECTION INTRAMUSCULAR; INTRAVENOUS EVERY 6 HOURS PRN
Status: DISCONTINUED | OUTPATIENT
Start: 2019-04-02 | End: 2019-04-03 | Stop reason: HOSPADM

## 2019-04-02 RX ORDER — HYDROMORPHONE HYDROCHLORIDE 1 MG/ML
0.2 INJECTION, SOLUTION INTRAMUSCULAR; INTRAVENOUS; SUBCUTANEOUS EVERY 5 MIN PRN
Status: DISCONTINUED | OUTPATIENT
Start: 2019-04-02 | End: 2019-04-03 | Stop reason: HOSPADM

## 2019-04-02 RX ADMIN — ONDANSETRON 4 MG: 2 INJECTION INTRAMUSCULAR; INTRAVENOUS at 04:04

## 2019-04-02 RX ADMIN — CEFAZOLIN 2 G: 330 INJECTION, POWDER, FOR SOLUTION INTRAMUSCULAR; INTRAVENOUS at 03:04

## 2019-04-02 RX ADMIN — SODIUM CHLORIDE, SODIUM GLUCONATE, SODIUM ACETATE, POTASSIUM CHLORIDE, MAGNESIUM CHLORIDE, SODIUM PHOSPHATE, DIBASIC, AND POTASSIUM PHOSPHATE: .53; .5; .37; .037; .03; .012; .00082 INJECTION, SOLUTION INTRAVENOUS at 11:04

## 2019-04-02 RX ADMIN — HEPARIN SODIUM 8000 UNITS: 1000 INJECTION INTRAVENOUS; SUBCUTANEOUS at 12:04

## 2019-04-02 RX ADMIN — AMIODARONE HYDROCHLORIDE 200 MG: 200 TABLET ORAL at 05:04

## 2019-04-02 RX ADMIN — HEPARIN SODIUM AND DEXTROSE 4000 UNITS/HR: 10000; 5 INJECTION INTRAVENOUS at 12:04

## 2019-04-02 RX ADMIN — FUROSEMIDE 20 MG: 10 INJECTION, SOLUTION INTRAMUSCULAR; INTRAVENOUS at 04:04

## 2019-04-02 RX ADMIN — SODIUM CHLORIDE 0.25 MCG/KG/MIN: 9 INJECTION, SOLUTION INTRAVENOUS at 12:04

## 2019-04-02 RX ADMIN — SUCCINYLCHOLINE CHLORIDE 100 MG: 20 INJECTION, SOLUTION INTRAMUSCULAR; INTRAVENOUS at 11:04

## 2019-04-02 RX ADMIN — SODIUM CHLORIDE: 0.9 INJECTION, SOLUTION INTRAVENOUS at 11:04

## 2019-04-02 RX ADMIN — ONDANSETRON 4 MG: 2 INJECTION INTRAMUSCULAR; INTRAVENOUS at 07:04

## 2019-04-02 RX ADMIN — HYDROMORPHONE HYDROCHLORIDE 0.2 MG: 1 INJECTION, SOLUTION INTRAMUSCULAR; INTRAVENOUS; SUBCUTANEOUS at 06:04

## 2019-04-02 RX ADMIN — PROTAMINE SULFATE 70 MG: 10 INJECTION, SOLUTION INTRAVENOUS at 04:04

## 2019-04-02 RX ADMIN — ROCURONIUM BROMIDE 10 MG: 10 INJECTION, SOLUTION INTRAVENOUS at 11:04

## 2019-04-02 RX ADMIN — LIDOCAINE HYDROCHLORIDE 100 MG: 20 INJECTION, SOLUTION INTRAVENOUS at 11:04

## 2019-04-02 RX ADMIN — CEFAZOLIN 2 G: 330 INJECTION, POWDER, FOR SOLUTION INTRAMUSCULAR; INTRAVENOUS at 11:04

## 2019-04-02 RX ADMIN — HEPARIN SODIUM 3000 UNITS: 1000 INJECTION INTRAVENOUS; SUBCUTANEOUS at 01:04

## 2019-04-02 RX ADMIN — FENTANYL CITRATE 25 MCG: 50 INJECTION, SOLUTION INTRAMUSCULAR; INTRAVENOUS at 01:04

## 2019-04-02 RX ADMIN — ASPIRIN 81 MG: 81 TABLET, COATED ORAL at 10:04

## 2019-04-02 RX ADMIN — PROPOFOL 100 MG: 10 INJECTION, EMULSION INTRAVENOUS at 11:04

## 2019-04-02 RX ADMIN — PRAVASTATIN SODIUM 40 MG: 20 TABLET ORAL at 10:04

## 2019-04-02 RX ADMIN — MIDAZOLAM 2 MG: 1 INJECTION INTRAMUSCULAR; INTRAVENOUS at 11:04

## 2019-04-02 RX ADMIN — PROTAMINE SULFATE 10 MG: 10 INJECTION, SOLUTION INTRAVENOUS at 03:04

## 2019-04-02 RX ADMIN — ISOPROTERENOL HYDROCHLORIDE 1 MCG/MIN: 0.2 INJECTION, SOLUTION INTRAMUSCULAR; INTRAVENOUS at 01:04

## 2019-04-02 NOTE — SUBJECTIVE & OBJECTIVE
History reviewed. No pertinent past medical history.    Past Surgical History:   Procedure Laterality Date    ABLATION N/A 3/6/2018    Performed by Dennys Henao MD at [facility] CATH LAB    TRANSESOPHAGEAL ECHOCARDIOGRAM (BRYAN) N/A 3/6/2018    Performed by Dennys Henao MD at [facility] CATH LAB       Review of patient's allergies indicates:   Allergen Reactions    Mucinex [guaifenesin]     Bactrim [sulfamethoxazole-trimethoprim] Rash    Sulfa (sulfonamide antibiotics) Itching and Rash       Current Facility-Administered Medications on File Prior to Encounter   Medication    0.9%  NaCl infusion    ceFAZolin injection 2 g     Current Outpatient Medications on File Prior to Encounter   Medication Sig    amiodarone (PACERONE) 200 MG Tab Take 200 mg by mouth once daily.    amLODIPine (NORVASC) 10 MG tablet Take 10 mg by mouth once daily.    aspirin (ECOTRIN) 81 MG EC tablet Take 81 mg by mouth nightly.    CALCIUM CARBONATE/VITAMIN D3 (CALCIUM 600 WITH VITAMIN D3 ORAL) Take by mouth nightly.    ergocalciferol (VITAMIN D2) 50,000 unit Cap Take 50,000 Units by mouth every 7 days.    glipiZIDE (GLUCOTROL) 10 MG tablet Take 10 mg by mouth nightly.    ibuprofen (ADVIL,MOTRIN) 400 MG tablet Take 1 tablet (400 mg total) by mouth every 6 (six) hours as needed for Pain (chest discomfort).    JANUMET XR 50-1,000 mg TM24     levothyroxine (SYNTHROID) 75 MCG tablet Take 75 mcg by mouth once daily.    magnesium oxide (MAG-OX) 400 mg tablet Take 400 mg by mouth nightly.    metoprolol succinate (TOPROL-XL) 25 MG 24 hr tablet Take 3 tablets (75 mg total) by mouth once daily.    omeprazole (PRILOSEC) 20 MG capsule Take 1 capsule (20 mg total) by mouth once daily. May resume after protonix/pantoprazole prescription completed    pantoprazole (PROTONIX) 40 MG tablet Take 1 tablet (40 mg total) by mouth once daily.    potassium gluconate 595 mg (99 mg) TbSR Take by mouth nightly.    pravastatin (PRAVACHOL) 40 MG tablet Take 40  mg by mouth nightly.    rivaroxaban (XARELTO) 20 mg Tab Take 20 mg by mouth daily with dinner or evening meal.     Family History     None        Tobacco Use    Smoking status: Never Smoker    Smokeless tobacco: Never Used   Substance and Sexual Activity    Alcohol use: Not on file    Drug use: Not on file    Sexual activity: Not on file     ROS  Objective:     Vital Signs (Most Recent):    Vital Signs (24h Range):           There is no height or weight on file to calculate BMI.            No intake or output data in the 24 hours ending 04/02/19 0855    Lines/Drains/Airways          None          Physical Exam   Vitals reviewed.  Additional physical exam not performed due to cancellation of procedure.

## 2019-04-02 NOTE — Clinical Note
Family updated via phone call. Confirmed getting text messages. He is at the hospital, but went to grab lunch.

## 2019-04-02 NOTE — Clinical Note
Patient's elbows and knees padded, heels floated. Heated blanket applied with safety strap secured above the knees.

## 2019-04-02 NOTE — Clinical Note
Patient was seen 8/6/18 for a nurse visit to check blood pressure. Patient also brought in her bp cuff to compare. Dr. Laurent Clancy reviewed bp readings from that visit and she states numbers look better and to keep checking at home intermittently, couple times a week. The sheath is inserted into the right femoral vein.

## 2019-04-02 NOTE — H&P
[facility]  Cardiac Electrophysiology  History and Physical     Admission Date: 4/2/2019  Code Status: No Order   Attending Provider: Dennys Henao MD   Principal Problem:Persistent atrial fibrillation    Subjective:     Chief Complaint:  Persistent AF     HPI: Ms. Mccartney is a 78 year old female with a PMHx of HTN, HLD, DM2, and sick sinus syndrome status-post [employer] dual chamber pacemaker implantation in 7/2008. Her AF history dates back prior to her pacemaker implantation, when she would have episodes of paroxysmal AF with RVR which would then revert to profound sinus bradycardia. She had been on Sotalol 160 mg bid for a number of years. She would have rare episodes lasting minutes up until recently, but then had three cardioversions for persistent AF, including two in 1/2018 which were two days apart. Sotalol was changed to Amiodarone after the last DCCV. She is in Xarelto for stroke prevention.     At her initial office visit, Dr. Henao reviewed a device check dated 12/19/2017 which showed she had a <1% AF burden since 5/4/2017. Episodes would last on average several minutes, with the longest episode lasting 30 minutes. He recommended DCCV after her Amiodarone load was completed. A DCCV was performed, but she reverted to AF within 5 days.     Recent cardiac studies include an echo done in 1/2018 which showed an EF of 40% with concentric LVH, and no significant valvular disease.     On 3/5/2018, a PVI was perfomed. In addition to PVI, three ATs were successfully targeted during the case (roof AT adjacent to the LSPV, AT arising from the anterior base of the CIARA, mid-septal AT), with ablation of the septal AT restoring sinus rhythm. She was discharged on Amiodarone.      On 4/24/2018, Ms. Mccartney presented to [facility] with dizziness and near syncope, and was found to be in atrial flutter with RVR at 113 bpm. She spontaneously converted to sinus rhythm. Notably, Ms. Mccartney was under a great  amount of stress at the time. At a visit with me several days later, she was in atrial tachycardia  ms. Toprol XL was increased, and she underwent DCCV on 5/16/2018. A device check performed on 6/19/2018 showed no further AF episodes since her DCCV. She remained on Xarelto and Amiodarone. At her 9/2018 visit, a limited device interrogation showed no AF since her last visit. Amiodarone was dropped to 100 mg daily. On 11/2/2018, Ms. Mccartney presented to [facility] in AF, and was cardioverted. Amiodarone was increased back to 200 mg daily. I reviewed her presenting ECG which showed atrial flutter (atypical) at 137 bpm. At her last visit in 11/2018, we discussed options including re-do PVI vs holding the course. She wanted to hold the course at that time.     In 1/2019, Ms. Mccartney presented to [facility] with palpitations and chest pain, and was found to be in atrial tachycardia at 116 bpm. She spontaneously reverted to sinus rhythm. She is currently on Amiodarone 400 mg bid. Three days following discharge she had another episode of AF lasting 1 day.     During her last office visit she discussed with Dr. Henao a plan for PVI CARTO. BRYAN AM of procedure--cancel if sinus. Drop Amiodarone to 400 mg daily starting today, and hold Amiodarone 1 week prior to procedure. Hold Xarelto evening prior to procedure. No re-do CT scan necessary, but order one if it was not done prior to the first PVI.     She presents today to [facility] for scheduled  re-do PVI with Dr. Henao. She denies any chest pain, SOB, dizziness, weakness, syncope, or near syncopal episodes. She does state he has GODDARD, light headedness, plapitations, and fatigue when she is out of rhythm. She denies any bleeding, infections, rashes, fevers, or surgeries in the past 30 days. BRAYN deferred per Dr. Henao as patient reports full compliance with xarelto, she has been taking this medication as prescribed for > 1 month, denies missing any doses, she is 100% compliant. Patient  denies Hx of CVA/TIA. She is currently taking amiodarone 200 mg daily, aspirin, amlodipine, toprol, and xarelto (last dose yesterday AM).     I have personally reviewed the patient's EKG today, which shows A-paced. EKG reviewed by Dr. Henao.    Past Medical History:   Diagnosis Date    CHF (congestive heart failure)     Coronary artery disease     Diabetes mellitus     Hypertension     Thyroid disease        Past Surgical History:   Procedure Laterality Date    ABLATION N/A 3/6/2018    Performed [facility] at [facility] C[facility]    CHOLECYSTECTOMY      35 years ago approx    CORONARY ANGIOPLASTY WITH STENT PLACEMENT  2006    HYSTERECTOMY      TONSILLECTOMY      TRANSESOPHAGEAL ECHOCARDIOGRAM (BRYAN) N/A 3/6/2018    Performed by Dennys H[facility]o MD at [facility] CATH LAB       Review of patient's allergies indicates:   Allergen Reactions    Mucinex [guaifenesin]     Bactrim [sulfamethoxazole-trimethoprim] Rash    Sulfa (sulfonamide antibiotics) Itching and Rash       No current facility-administered medications on file prior to encounter.      Current Outpatient Medications on File Prior to Encounter   Medication Sig    amLODIPine (NORVASC) 10 MG tablet Take 10 mg by mouth once daily.    aspirin (ECOTRIN) 81 MG EC tablet Take 81 mg by mouth nightly.    CALCIUM CARBONATE/VITAMIN D3 (CALCIUM 600 WITH VITAMIN D3 ORAL) Take by mouth nightly.    ergocalciferol (VITAMIN D2) 50,000 unit Cap Take 50,000 Units by mouth every 7 days.    glipiZIDE (GLUCOTROL) 10 MG tablet Take 10 mg by mouth nightly.    JANUMET XR 50-1,000 mg TM24     levothyroxine (SYNTHROID) 75 MCG tablet Take 75 mcg by mouth once daily.    magnesium oxide (MAG-OX) 400 mg tablet Take 400 mg by mouth nightly.    metoprolol succinate (TOPROL-XL) 25 MG 24 hr tablet Take 3 tablets (75 mg total) by mouth once daily.    omeprazole (PRILOSEC) 20 MG capsule Take 1 capsule (20 mg total) by mouth once daily. May resume after protonix/pantoprazole  prescription completed    potassium gluconate 595 mg (99 mg) TbSR Take by mouth nightly.    pravastatin (PRAVACHOL) 40 MG tablet Take 40 mg by mouth nightly.    rivaroxaban (XARELTO) 20 mg Tab Take 20 mg by mouth daily with dinner or evening meal.    amiodarone (PACERONE) 200 MG Tab Take 200 mg by mouth once daily.    ibuprofen (ADVIL,MOTRIN) 400 MG tablet Take 1 tablet (400 mg total) by mouth every 6 (six) hours as needed for Pain (chest discomfort).    pantoprazole (PROTONIX) 40 MG tablet Take 1 tablet (40 mg total) by mouth once daily.     Family History     None        Tobacco Use    Smoking status: Never Smoker    Smokeless tobacco: Never Used   Substance and Sexual Activity    Alcohol use: Not Currently    Drug use: Never    Sexual activity: Not on file     Review of Systems   Constitution: Positive for malaise/fatigue. Negative for chills and fever.   HENT: Negative for congestion and nosebleeds.    Eyes: Negative for blurred vision.   Cardiovascular: Positive for palpitations. Negative for chest pain, dyspnea on exertion, irregular heartbeat, leg swelling, near-syncope, orthopnea, paroxysmal nocturnal dyspnea and syncope.   Respiratory: Negative for cough, hemoptysis, shortness of breath, sleep disturbances due to breathing, sputum production and wheezing.    Endocrine: Negative for polyphagia.   Hematologic/Lymphatic: Negative for bleeding problem. Does not bruise/bleed easily.   Skin: Negative for itching and rash.   Musculoskeletal: Negative for back pain, joint swelling, muscle cramps and muscle weakness.   Gastrointestinal: Negative for bloating, abdominal pain, hematemesis, hematochezia, nausea and vomiting.   Genitourinary: Negative for dysuria and hematuria.   Neurological: Positive for light-headedness. Negative for dizziness, focal weakness, headaches, loss of balance, numbness and weakness.   Psychiatric/Behavioral: Negative for altered mental status.     Objective:     Vital Signs  (Most Recent):  Temp: 97.2 °F (36.2 °C) (04/02/19 0915)  Pulse: 60 (04/02/19 0915)  Resp: 20 (04/02/19 0915)  BP: (!) 154/68 (04/02/19 0926)  SpO2: 99 % (04/02/19 0915) Vital Signs (24h Range):  Temp:  [97.2 °F (36.2 °C)] 97.2 °F (36.2 °C)  Pulse:  [60] 60  Resp:  [20] 20  SpO2:  [99 %] 99 %  BP: (139-154)/(63-68) 154/68       Weight: 73.9 kg (163 lb)  Body mass index is 27.98 kg/m².    SpO2: 99 %  O2 Device (Oxygen Therapy): room air    Physical Exam   Constitutional: She is oriented to person, place, and time. She appears well-developed and well-nourished. No distress.   HENT:   Head: Normocephalic and atraumatic.   Mouth/Throat: No oropharyngeal exudate.   Eyes: Pupils are equal, round, and reactive to light. Conjunctivae are normal. Right eye exhibits no discharge. Left eye exhibits no discharge.   Neck: Normal range of motion. Neck supple.   Cardiovascular: Normal rate, regular rhythm, S1 normal, S2 normal, normal heart sounds, intact distal pulses and normal pulses.  No extrasystoles are present. PMI is not displaced. Exam reveals no gallop and no friction rub.   No murmur heard.  Pulses:       Radial pulses are 2+ on the right side, and 2+ on the left side.        Dorsalis pedis pulses are 2+ on the right side, and 2+ on the left side.   Pulmonary/Chest: Effort normal and breath sounds normal. No accessory muscle usage. No respiratory distress. She has no decreased breath sounds. She has no wheezes. She has no rhonchi. She has no rales. She exhibits no tenderness.   Left chest wall device site in good condition.   Abdominal: Soft. Bowel sounds are normal. She exhibits no distension and no mass. There is no tenderness. There is no rebound and no guarding.   Musculoskeletal: Normal range of motion. She exhibits no edema.   Neurological: She is alert and oriented to person, place, and time. She has normal strength. She is not disoriented. No cranial nerve deficit or sensory deficit. She exhibits normal muscle  tone. Coordination and gait normal.   Skin: Skin is warm and dry. No rash noted. She is not diaphoretic. No erythema.   Psychiatric: She has a normal mood and affect. Her behavior is normal. Judgment and thought content normal.   Nursing note and vitals reviewed.      Significant Labs: Outside labs reviewed. Labs from today reviewed.   Significant Imaging: Echocardiogram: Echo 3/2018:  CONCLUSIONS     1 - Left atrial appendage is bilobed with spontaneous echo contrast with no visualized thrombus.     2 - Mildly depressed right ventricular function .     3 - Mildly depressed left ventricular systolic function.     4 - Severe mitral regurgitation.     5 - Mild tricuspid regurgitation.     6 - Grade 1 atheroma disease of aorta.   At least 2 mobile echodensities seen on the proximal RA lead wire closer to the RAA, the largest of which is at least 1.3 cm x 0.5 cm, representing fibrous tissue, vs thrmobus vs.  vegetation, clinical correalation is advised.    Assessment and Plan:     * Persistent atrial fibrillation  -PVI.  -Anesthesia for sedation.  -BRYAN deferred per Dr. Henao.    Prior to procedure, extensive discussion with patient regarding risks and benefits of PVI, and patient would like to proceed. Dr. Henao at bedside speaking with patient and family.  Risks of procedure include but are not limited to the risk of infection, bleeding, stroke, paralysis, cardiac tamponade, pulmonary vein stenosis, atrioesophageal fistula, phrenic nerve damage, MI, embolism, perforation requiring emergency draining or surgery, AV block, possibility for need for  pacemaker implantation, and death.  The patient voices understanding and all questions have been answered. No further questions/concerns voiced at this time.      Colette Guzman NP  Cardiac Electrophysiology  [facility]    Attending: Dennys Henao MD

## 2019-04-02 NOTE — ANESTHESIA PROCEDURE NOTES
Arterial    Diagnosis: a fib    Patient location during procedure: done in OR  Procedure start time: 4/2/2019 12:30 PM  Timeout: 4/2/2019 12:30 PM  Procedure end time: 4/2/2019 12:30 PM  Staffing  Anesthesiologist: Dakota Suggs MD  Performed: anesthesiologist   Anesthesiologist was present at the time of the procedure.  Preanesthetic Checklist  Completed: patient identified, site marked, surgical consent, pre-op evaluation, timeout performed, IV checked, risks and benefits discussed, monitors and equipment checked and anesthesia consent givenArterial  Skin Prep: chlorhexidine gluconate and isopropyl alcohol  Local Infiltration: none  Orientation: right  Location: radial  Catheter Size: 20 G  Catheter placement by Anatomical landmarks. Heme positive aspiration all ports.Insertion Attempts: 2  Assessment  Dressing: secured with tape and tegaderm  Patient: Tolerated well

## 2019-04-02 NOTE — SUBJECTIVE & OBJECTIVE
Past Medical History:   Diagnosis Date    CHF (congestive heart failure)     Coronary artery disease     Diabetes mellitus     Hypertension     Thyroid disease        Past Surgical History:   Procedure Laterality Date    ABLATION N/A 3/6/2018    Performed by [facility]o [facility] at [facility] CATH LAB    CHOLECYSTECTOMY      35 years ago approx    CORONARY ANGIOPLASTY WITH STENT PLACEMENT  2006    HYSTERECTOMY      TONSILLECTOMY      TRANSESOPHAGEAL ECHOCARDIOGRAM (BRYAN) N/A 3/6/2018    Performed by Dennys Henao MD at [facility] CATH LAB       Review of patient's allergies indicates:   Allergen Reactions    Mucinex [guaifenesin]     Bactrim [sulfamethoxazole-trimethoprim] Rash    Sulfa (sulfonamide antibiotics) Itching and Rash       No current facility-administered medications on file prior to encounter.      Current Outpatient Medications on File Prior to Encounter   Medication Sig    amLODIPine (NORVASC) 10 MG tablet Take 10 mg by mouth once daily.    aspirin (ECOTRIN) 81 MG EC tablet Take 81 mg by mouth nightly.    CALCIUM CARBONATE/VITAMIN D3 (CALCIUM 600 WITH VITAMIN D3 ORAL) Take by mouth nightly.    ergocalciferol (VITAMIN D2) 50,000 unit Cap Take 50,000 Units by mouth every 7 days.    glipiZIDE (GLUCOTROL) 10 MG tablet Take 10 mg by mouth nightly.    JANUMET XR 50-1,000 mg TM24     levothyroxine (SYNTHROID) 75 MCG tablet Take 75 mcg by mouth once daily.    magnesium oxide (MAG-OX) 400 mg tablet Take 400 mg by mouth nightly.    metoprolol succinate (TOPROL-XL) 25 MG 24 hr tablet Take 3 tablets (75 mg total) by mouth once daily.    omeprazole (PRILOSEC) 20 MG capsule Take 1 capsule (20 mg total) by mouth once daily. May resume after protonix/pantoprazole prescription completed    potassium gluconate 595 mg (99 mg) TbSR Take by mouth nightly.    pravastatin (PRAVACHOL) 40 MG tablet Take 40 mg by mouth nightly.    rivaroxaban (XARELTO) 20 mg Tab Take 20 mg by mouth daily with dinner or evening  meal.    amiodarone (PACERONE) 200 MG Tab Take 200 mg by mouth once daily.    ibuprofen (ADVIL,MOTRIN) 400 MG tablet Take 1 tablet (400 mg total) by mouth every 6 (six) hours as needed for Pain (chest discomfort).    pantoprazole (PROTONIX) 40 MG tablet Take 1 tablet (40 mg total) by mouth once daily.     Family History     None        Tobacco Use    Smoking status: Never Smoker    Smokeless tobacco: Never Used   Substance and Sexual Activity    Alcohol use: Not Currently    Drug use: Never    Sexual activity: Not on file     Review of Systems   Constitution: Positive for malaise/fatigue. Negative for chills and fever.   HENT: Negative for congestion and nosebleeds.    Eyes: Negative for blurred vision.   Cardiovascular: Positive for palpitations. Negative for chest pain, dyspnea on exertion, irregular heartbeat, leg swelling, near-syncope, orthopnea, paroxysmal nocturnal dyspnea and syncope.   Respiratory: Negative for cough, hemoptysis, shortness of breath, sleep disturbances due to breathing, sputum production and wheezing.    Endocrine: Negative for polyphagia.   Hematologic/Lymphatic: Negative for bleeding problem. Does not bruise/bleed easily.   Skin: Negative for itching and rash.   Musculoskeletal: Negative for back pain, joint swelling, muscle cramps and muscle weakness.   Gastrointestinal: Negative for bloating, abdominal pain, hematemesis, hematochezia, nausea and vomiting.   Genitourinary: Negative for dysuria and hematuria.   Neurological: Positive for light-headedness. Negative for dizziness, focal weakness, headaches, loss of balance, numbness and weakness.   Psychiatric/Behavioral: Negative for altered mental status.     Objective:     Vital Signs (Most Recent):  Temp: 97.2 °F (36.2 °C) (04/02/19 0915)  Pulse: 60 (04/02/19 0915)  Resp: 20 (04/02/19 0915)  BP: (!) 154/68 (04/02/19 0926)  SpO2: 99 % (04/02/19 0915) Vital Signs (24h Range):  Temp:  [97.2 °F (36.2 °C)] 97.2 °F (36.2 °C)  Pulse:   [60] 60  Resp:  [20] 20  SpO2:  [99 %] 99 %  BP: (139-154)/(63-68) 154/68       Weight: 73.9 kg (163 lb)  Body mass index is 27.98 kg/m².    SpO2: 99 %  O2 Device (Oxygen Therapy): room air    Physical Exam   Constitutional: She is oriented to person, place, and time. She appears well-developed and well-nourished. No distress.   HENT:   Head: Normocephalic and atraumatic.   Mouth/Throat: No oropharyngeal exudate.   Eyes: Pupils are equal, round, and reactive to light. Conjunctivae are normal. Right eye exhibits no discharge. Left eye exhibits no discharge.   Neck: Normal range of motion. Neck supple.   Cardiovascular: Normal rate, regular rhythm, S1 normal, S2 normal, normal heart sounds, intact distal pulses and normal pulses.  No extrasystoles are present. PMI is not displaced. Exam reveals no gallop and no friction rub.   No murmur heard.  Pulses:       Radial pulses are 2+ on the right side, and 2+ on the left side.        Dorsalis pedis pulses are 2+ on the right side, and 2+ on the left side.   Pulmonary/Chest: Effort normal and breath sounds normal. No accessory muscle usage. No respiratory distress. She has no decreased breath sounds. She has no wheezes. She has no rhonchi. She has no rales. She exhibits no tenderness.   Left chest wall device site in good condition.   Abdominal: Soft. Bowel sounds are normal. She exhibits no distension and no mass. There is no tenderness. There is no rebound and no guarding.   Musculoskeletal: Normal range of motion. She exhibits no edema.   Neurological: She is alert and oriented to person, place, and time. She has normal strength. She is not disoriented. No cranial nerve deficit or sensory deficit. She exhibits normal muscle tone. Coordination and gait normal.   Skin: Skin is warm and dry. No rash noted. She is not diaphoretic. No erythema.   Psychiatric: She has a normal mood and affect. Her behavior is normal. Judgment and thought content normal.   Nursing note and  vitals reviewed.      Significant Labs:   Significant Imaging: Echocardiogram:

## 2019-04-02 NOTE — ANESTHESIA PREPROCEDURE EVALUATION
04/02/2019  Sayda Mccartney is a 78 y.o., female   Patient Active Problem List   Diagnosis    Persistent atrial fibrillation    History of cardioversion    Long term current use of antiarrhythmic drug    Sick sinus syndrome    Cardiac pacemaker in situ    Essential hypertension    Dyslipidemia    Diabetes mellitus with coincident hypertension    Current use of anticoagulant therapy    S/P ablation of atrial fibrillation    Atrial tachycardia    Atrial fibrillation     .    Anesthesia Evaluation         Review of Systems      Physical Exam  General:  Well nourished    Airway/Jaw/Neck:  Airway Findings: Mouth Opening: Normal Tongue: Normal  General Airway Assessment: Adult  Mallampati: II  Improves to II with phonation.  TM Distance: Normal, at least 6 cm      Dental:  Dental Findings: In tact   Chest/Lungs:  Chest/Lungs Findings: Clear to auscultation     Heart/Vascular:  Heart Findings: Rate: Normal  Rhythm: Regular Rhythm  Sounds: Normal        Mental Status:  Mental Status Findings:  Cooperative, Alert and Oriented         Anesthesia Plan  Type of Anesthesia, risks & benefits discussed:  Anesthesia Type:  general  Patient's Preference: General  Intra-op Monitoring Plan: standard ASA monitors and arterial line  Intra-op Monitoring Plan Comments: Standard ASA monitors.   Post Op Pain Control Plan: per primary service following discharge from PACU  Post Op Pain Control Plan Comments: Per primary service.     Induction:   IV  Beta Blocker:  Patient is not currently on a Beta-Blocker (No further documentation required).       Informed Consent: Patient understands risks and agrees with Anesthesia plan.  Questions answered. Anesthesia consent signed with patient.  ASA Score: 3     Day of Surgery Review of History & Physical:    H&P update referred to the surgeon.     Anesthesia Plan Notes: Chart  reviewed, patient interviewed and examined.  The plan for general anesthesia was explained.  Questions were answered and the consent was signed.  [facility] From Anesthesia Perspective.

## 2019-04-02 NOTE — ASSESSMENT & PLAN NOTE
PLAN:  1. BRYAN cancelled as patient in SR, no hx of TIA/CVA, and compliant with Xarelto  2. Return for PVI as planned

## 2019-04-02 NOTE — DISCHARGE SUMMARY
[facility]  Cardiology  Discharge Summary      Patient Name: Sayda Mccartney  MRN: [id number]  Admission Date: 4/1/2019  Hospital Length of Stay: 0 days  Discharge Date and Time:  04/02/2019 8:55 AM  Attending Physician: No att. providers found    Discharging Provider: Torres Arizmendi MD  Primary Care Physician: Sarah Comer    HPI:   78F with hx of HTN, HLD, DM2, SSS s/p DC PPM, atrial fibrillation presented for BRYAN prior to planned PVI.  She was found to be in sinus rhythm on ECG, thus per prior plan BRYAN was cancelled. She was discharged home and will return as planned for PVI. No changes to pre-procedure instructions. Compliant with Xarelto.     ECG: AP-VS, 60 bpm    Procedure(s) (LRB):  ECHOCARDIOGRAM, TRANSESOPHAGEAL (N/A)     Indwelling Lines/Drains at time of discharge:  Lines/Drains/Airways          None          Hospital Course:  No notes on file    Consults:     Significant Diagnostic Studies: Labs: BMP: No results for input(s): GLU, NA, K, CL, CO2, BUN, CREATININE, CALCIUM, MG in the last 48 hours., CMP No results for input(s): NA, K, CL, CO2, GLU, BUN, CREATININE, CALCIUM, PROT, ALBUMIN, BILITOT, ALKPHOS, AST, ALT, ANIONGAP, ESTGFRAFRICA, EGFRNONAA in the last 48 hours., CBC No results for input(s): WBC, HGB, HCT, PLT in the last 48 hours. and INR No results found for: INR, PROTIME    Pending Diagnostic Studies:     Procedure Component Value Units Date/Time    Transesophageal echo (BRYAN) [[id number]]     Order Status:  Sent Lab Status:  No result           Final Active Diagnoses:    Diagnosis Date Noted POA    Atrial fibrillation [I48.91] 04/01/2019 Yes      Problems Resolved During this Admission:         Discharged Condition: good    Disposition: Home or Self Care    Follow Up:    Patient Instructions:   No discharge procedures on file.  Medications:  Reconciled Home Medications:      Medication List      CONTINUE taking these medications    amiodarone 200 MG Tab  Commonly known  as:  PACERONE  Take 200 mg by mouth once daily.     amLODIPine 10 MG tablet  Commonly known as:  NORVASC  Take 10 mg by mouth once daily.     aspirin 81 MG EC tablet  Commonly known as:  ECOTRIN  Take 81 mg by mouth nightly.     CALCIUM 600 WITH VITAMIN D3 ORAL  Take by mouth nightly.     glipiZIDE 10 MG tablet  Commonly known as:  GLUCOTROL  Take 10 mg by mouth nightly.     ibuprofen 400 MG tablet  Commonly known as:  ADVIL,MOTRIN  Take 1 tablet (400 mg total) by mouth every 6 (six) hours as needed for Pain (chest discomfort).     JANUMET XR 50-1,000 mg Tm24  Generic drug:  SITagliptan-metformin     levothyroxine 75 MCG tablet  Commonly known as:  SYNTHROID  Take 75 mcg by mouth once daily.     magnesium oxide 400 mg (241.3 mg magnesium) tablet  Commonly known as:  MAG-OX  Take 400 mg by mouth nightly.     metoprolol succinate 25 MG 24 hr tablet  Commonly known as:  TOPROL-XL  Take 3 tablets (75 mg total) by mouth once daily.     omeprazole 20 MG capsule  Commonly known as:  PRILOSEC  Take 1 capsule (20 mg total) by mouth once daily. May resume after protonix/pantoprazole prescription completed     pantoprazole 40 MG tablet  Commonly known as:  PROTONIX  Take 1 tablet (40 mg total) by mouth once daily.     potassium gluconate 595 mg (99 mg) Tbsr  Take by mouth nightly.     pravastatin 40 MG tablet  Commonly known as:  PRAVACHOL  Take 40 mg by mouth nightly.     VITAMIN D2 50,000 unit Cap  Generic drug:  ergocalciferol  Take 50,000 Units by mouth every 7 days.     XARELTO 20 mg Tab  Generic drug:  rivaroxaban  Take 20 mg by mouth daily with dinner or evening meal.            Time spent on the discharge of patient: 10 minutes    Torres Arizmendi MD  Cardiology  [facility]

## 2019-04-02 NOTE — H&P
[facility]  BRYAN History and Physical     Patient Name: Sayda Mccartney  MRN: [id number]  Admission Date: 4/1/2019  Code Status: No Order   Attending Provider: No att. providers found   Primary Care Physician: Sarah Comer  Principal Problem:<principal problem not specified>    Patient information was obtained from patient.     Subjective:     Chief Complaint:  afib     HPI:  78F with hx of HTN, HLD, DM2, SSS s/p DC PPM, atrial fibrillation presented for BRYAN prior to planned PVI.  She was found to be in sinus rhythm on ECG, thus per prior plan BRYAN was cancelled. She was discharged home and will return as planned for PVI. No changes to pre-procedure instructions. Compliant with Xarelto.     ECG: AP-VS, 60 bpm    History reviewed. No pertinent past medical history.    Past Surgical History:   Procedure Laterality Date    ABLATION N/A 3/6/2018    Performed by Dennys Henao MD at [facility] CATH LAB    TRANSESOPHAGEAL ECHOCARDIOGRAM (BRYAN) N/A 3/6/2018    Performed by Dennys Henao MD at [facility] CATH LAB       Review of patient's allergies indicates:   Allergen Reactions    Mucinex [guaifenesin]     Bactrim [sulfamethoxazole-trimethoprim] Rash    Sulfa (sulfonamide antibiotics) Itching and Rash       Current Facility-Administered Medications on File Prior to Encounter   Medication    0.9%  NaCl infusion    ceFAZolin injection 2 g     Current Outpatient Medications on File Prior to Encounter   Medication Sig    amiodarone (PACERONE) 200 MG Tab Take 200 mg by mouth once daily.    amLODIPine (NORVASC) 10 MG tablet Take 10 mg by mouth once daily.    aspirin (ECOTRIN) 81 MG EC tablet Take 81 mg by mouth nightly.    CALCIUM CARBONATE/VITAMIN D3 (CALCIUM 600 WITH VITAMIN D3 ORAL) Take by mouth nightly.    ergocalciferol (VITAMIN D2) 50,000 unit Cap Take 50,000 Units by mouth every 7 days.    glipiZIDE (GLUCOTROL) 10 MG tablet Take 10 mg by mouth nightly.    ibuprofen (ADVIL,MOTRIN) 400 MG tablet  Take 1 tablet (400 mg total) by mouth every 6 (six) hours as needed for Pain (chest discomfort).    JANUMET XR 50-1,000 mg TM24     levothyroxine (SYNTHROID) 75 MCG tablet Take 75 mcg by mouth once daily.    magnesium oxide (MAG-OX) 400 mg tablet Take 400 mg by mouth nightly.    metoprolol succinate (TOPROL-XL) 25 MG 24 hr tablet Take 3 tablets (75 mg total) by mouth once daily.    omeprazole (PRILOSEC) 20 MG capsule Take 1 capsule (20 mg total) by mouth once daily. May resume after protonix/pantoprazole prescription completed    pantoprazole (PROTONIX) 40 MG tablet Take 1 tablet (40 mg total) by mouth once daily.    potassium gluconate 595 mg (99 mg) TbSR Take by mouth nightly.    pravastatin (PRAVACHOL) 40 MG tablet Take 40 mg by mouth nightly.    rivaroxaban (XARELTO) 20 mg Tab Take 20 mg by mouth daily with dinner or evening meal.     Family History     None        Tobacco Use    Smoking status: Never Smoker    Smokeless tobacco: Never Used   Substance and Sexual Activity    Alcohol use: Not on file    Drug use: Not on file    Sexual activity: Not on file     ROS  Objective:     Vital Signs (Most Recent):    Vital Signs (24h Range):           There is no height or weight on file to calculate BMI.            No intake or output data in the 24 hours ending 04/02/19 0855    Lines/Drains/Airways          None          Physical Exam   Vitals reviewed.  Additional physical exam not performed due to cancellation of procedure.    Assessment and Plan:     Atrial fibrillation  PLAN:  1. BRYAN cancelled as patient in SR, no hx of TIA/CVA, and compliant with Xarelto  2. Return for PVI as planned        Torres Arizmendi MD  Cardiology   [facility]

## 2019-04-02 NOTE — HPI
78F with hx of HTN, HLD, DM2, SSS s/p DC PPM, atrial fibrillation presented for BRYAN prior to planned PVI.  She was found to be in sinus rhythm on ECG, thus per prior plan BRYAN was cancelled. She was discharged home and will return as planned for PVI. No changes to pre-procedure instructions. Compliant with Xarelto.     ECG: AP-VS, 60 bpm

## 2019-04-02 NOTE — TRANSFER OF CARE
"Anesthesia Transfer of Care Note    Patient: Sayda Mccartney    Procedure(s) Performed: Procedure(s) (LRB):  ABLATION, ARRHYTHMOGENIC FOCUS, FOR ATRIAL FIBRILLATION (N/A)    Patient location: PACU    Anesthesia Type: general    Transport from OR: Transported from OR on 6-10 L/min O2 by face mask with adequate spontaneous ventilation    Post pain: adequate analgesia    Post assessment: no apparent anesthetic complications    Post vital signs: stable    Level of consciousness: awake and alert    Nausea/Vomiting: no nausea/vomiting    Complications: none          Last vitals:   Visit Vitals  /63   Pulse 60   Temp 36.3 °C (97.3 °F) (Temporal)   Resp 17   Ht 5' 4" (1.626 m)   Wt 73.9 kg (163 lb)   SpO2 100%   Breastfeeding? No   BMI 27.98 kg/m²     "

## 2019-04-02 NOTE — HPI
Ms. Mccartney is a 78 year old female with a PMHx of HTN, HLD, DM2, and sick sinus syndrome status-post [employer] dual chamber pacemaker implantation in 7/2008. Her AF history dates back prior to her pacemaker implantation, when she would have episodes of paroxysmal AF with RVR which would then revert to profound sinus bradycardia. She had been on Sotalol 160 mg bid for a number of years. She would have rare episodes lasting minutes up until recently, but then had three cardioversions for persistent AF, including two in 1/2018 which were two days apart. Sotalol was changed to Amiodarone after the last DCCV. She is in Xarelto for stroke prevention.     At her initial office visit, Dr. Henao reviewed a device check dated 12/19/2017 which showed she had a <1% AF burden since 5/4/2017. Episodes would last on average several minutes, with the longest episode lasting 30 minutes. He recommended DCCV after her Amiodarone load was completed. A DCCV was performed, but she reverted to AF within 5 days.     Recent cardiac studies include an echo done in 1/2018 which showed an EF of 40% with concentric LVH, and no significant valvular disease.     On 3/5/2018, a PVI was perfomed. In addition to PVI, three ATs were successfully targeted during the case (roof AT adjacent to the LSPV, AT arising from the anterior base of the CIARA, mid-septal AT), with ablation of the septal AT restoring sinus rhythm. She was discharged on Amiodarone.      On 4/24/2018, Ms. Mccartney presented to [facility] with dizziness and near syncope, and was found to be in atrial flutter with RVR at 113 bpm. She spontaneously converted to sinus rhythm. Notably, Ms. Mccartney was under a great amount of stress at the time. At a visit with me several days later, she was in atrial tachycardia  ms. Toprol XL was increased, and she underwent DCCV on 5/16/2018. A device check performed on 6/19/2018 showed no further AF episodes since her DCCV. She remained on Xarelto and  Amiodarone. At her 9/2018 visit, a limited device interrogation showed no AF since her last visit. Amiodarone was dropped to 100 mg daily. On 11/2/2018, Ms. Mccartney presented to [facility] in AF, and was cardioverted. Amiodarone was increased back to 200 mg daily. I reviewed her presenting ECG which showed atrial flutter (atypical) at 137 bpm. At her last visit in 11/2018, we discussed options including re-do PVI vs holding the course. She wanted to hold the course at that time.     In 1/2019, Ms. Mccartney presented to [facility] with palpitations and chest pain, and was found to be in atrial tachycardia at 116 bpm. She spontaneously reverted to sinus rhythm. She is currently on Amiodarone 400 mg bid. Three days following discharge she had another episode of AF lasting 1 day.     During her last office visit she discussed with Dr. Henao a plan for PVI CARTO. BRYAN AM of procedure--cancel if sinus. Drop Amiodarone to 400 mg daily starting today, and hold Amiodarone 1 week prior to procedure. Hold Xarelto evening prior to procedure. No re-do CT scan necessary, but order one if it was not done prior to the first PVI.     She presents today to [facility] for scheduled re-do PVI with Dr. Henao. She denies any chest pain, SOB, dizziness, weakness, syncope, or near syncopal episodes. She does state he has GODDARD, light headedness, plapitations, and fatigue when she is out of rhythm. She denies any bleeding, infections, rashes, fevers, or surgeries in the past 30 days. BRYAN deferred per Dr. Henao as patient reports full compliance with xarelto, she has been taking this medication as prescribed for > 1 month, denies missing any doses, she is 100% compliant. Patient denies Hx of CVA/TIA.     I have personally reviewed the patient's EKG today, which shows A-paced. EKG reviewed by Dr. Henao.

## 2019-04-03 VITALS
TEMPERATURE: 98 F | WEIGHT: 164.69 LBS | SYSTOLIC BLOOD PRESSURE: 109 MMHG | HEIGHT: 64 IN | RESPIRATION RATE: 18 BRPM | OXYGEN SATURATION: 94 % | DIASTOLIC BLOOD PRESSURE: 53 MMHG | BODY MASS INDEX: 28.12 KG/M2 | HEART RATE: 60 BPM

## 2019-04-03 LAB — POCT GLUCOSE: 133 MG/DL (ref 70–110)

## 2019-04-03 PROCEDURE — 25000003 PHARM REV CODE 250: Performed by: STUDENT IN AN ORGANIZED HEALTH CARE EDUCATION/TRAINING PROGRAM

## 2019-04-03 PROCEDURE — 25000003 PHARM REV CODE 250: Performed by: NURSE PRACTITIONER

## 2019-04-03 RX ORDER — OXYCODONE HYDROCHLORIDE 5 MG/1
5 TABLET ORAL ONCE
Status: COMPLETED | OUTPATIENT
Start: 2019-04-03 | End: 2019-04-03

## 2019-04-03 RX ADMIN — AMLODIPINE BESYLATE 10 MG: 10 TABLET ORAL at 08:04

## 2019-04-03 RX ADMIN — AMIODARONE HYDROCHLORIDE 200 MG: 200 TABLET ORAL at 08:04

## 2019-04-03 RX ADMIN — RIVAROXABAN 20 MG: 20 TABLET, FILM COATED ORAL at 12:04

## 2019-04-03 RX ADMIN — PANTOPRAZOLE SODIUM 40 MG: 40 TABLET, DELAYED RELEASE ORAL at 08:04

## 2019-04-03 RX ADMIN — METOPROLOL SUCCINATE 75 MG: 25 TABLET, EXTENDED RELEASE ORAL at 08:04

## 2019-04-03 RX ADMIN — OXYCODONE HYDROCHLORIDE 5 MG: 5 TABLET ORAL at 05:04

## 2019-04-03 RX ADMIN — LEVOTHYROXINE SODIUM 75 MCG: 75 TABLET ORAL at 05:04

## 2019-04-03 NOTE — DISCHARGE SUMMARY
[facility]  Cardiac Electrophysiology  Discharge Summary      Patient Name: Sayda Mccartney  MRN: [id number]  Admission Date: 4/2/2019  Hospital Length of Stay: 0 days  Discharge Date and Time: 4/3/2019 11:17 AM  Attending Physician: Dennys Henao MD   Discharging Provider: Colette Guzman NP  Primary Care Physician: Sarah Comer    HPI: Ms. Mccartney is a 78 year old female with a PMHx of HTN, HLD, DM2, and sick sinus syndrome status-post [employer] dual chamber pacemaker implantation in 7/2008. Her AF history dates back prior to her pacemaker implantation, when she would have episodes of paroxysmal AF with RVR which would then revert to profound sinus bradycardia. She had been on Sotalol 160 mg bid for a number of years. She would have rare episodes lasting minutes up until recently, but then had three cardioversions for persistent AF, including two in 1/2018 which were two days apart. Sotalol was changed to Amiodarone after the last DCCV. She is in Xarelto for stroke prevention.     At her initial office visit, Dr. Henao reviewed a device check dated 12/19/2017 which showed she had a <1% AF burden since 5/4/2017. Episodes would last on average several minutes, with the longest episode lasting 30 minutes. He recommended DCCV after her Amiodarone load was completed. A DCCV was performed, but she reverted to AF within 5 days.     Recent cardiac studies include an echo done in 1/2018 which showed an EF of 40% with concentric LVH, and no significant valvular disease.     On 3/5/2018, a PVI was perfomed. In addition to PVI, three ATs were successfully targeted during the case (roof AT adjacent to the LSPV, AT arising from the anterior base of the CIARA, mid-septal AT), with ablation of the septal AT restoring sinus rhythm. She was discharged on Amiodarone.      On 4/24/2018, Ms. Mccartney presented to [facility] with dizziness and near syncope, and was found to be in atrial flutter with RVR at 113 bpm. She  spontaneously converted to sinus rhythm. Notably, Ms. Mccartney was under a great amount of stress at the time. At a visit with me several days later, she was in atrial tachycardia  ms. Toprol XL was increased, and she underwent DCCV on 5/16/2018. A device check performed on 6/19/2018 showed no further AF episodes since her DCCV. She remained on Xarelto and Amiodarone. At her 9/2018 visit, a limited device interrogation showed no AF since her last visit. Amiodarone was dropped to 100 mg daily. On 11/2/2018, Ms. Mccatrney presented to [facility] in AF, and was cardioverted. Amiodarone was increased back to 200 mg daily. I reviewed her presenting ECG which showed atrial flutter (atypical) at 137 bpm. At her last visit in 11/2018, we discussed options including re-do PVI vs holding the course. She wanted to hold the course at that time.     In 1/2019, Ms. Mccartney presented to [facility] with palpitations and chest pain, and was found to be in atrial tachycardia at 116 bpm. She spontaneously reverted to sinus rhythm. She is currently on Amiodarone 400 mg bid. Three days following discharge she had another episode of AF lasting 1 day.      During her last office visit she discussed with Dr. Henao a plan for PVI CARTO. BRYAN AM of procedure--cancel if sinus. Drop Amiodarone to 400 mg daily starting today, and hold Amiodarone 1 week prior to procedure. Hold Xarelto evening prior to procedure. No re-do CT scan necessary, but order one if it was not done prior to the first PVI.     She presents today to [facility] for scheduled  re-do PVI with Dr. Henao. She denies any chest pain, SOB, dizziness, weakness, syncope, or near syncopal episodes. She does state he has GODDARD, light headedness, plapitations, and fatigue when she is out of rhythm. She denies any bleeding, infections, rashes, fevers, or surgeries in the past 30 days. BRYAN deferred per Dr. Henao as patient reports full compliance with xarelto, she has been taking this medication as  prescribed for > 1 month, denies missing any doses, she is 100% compliant. Patient denies Hx of CVA/TIA. She is currently taking amiodarone 200 mg daily, aspirin, amlodipine, toprol, and xarelto (last dose yesterday AM).      I have personally reviewed the patient's EKG today, which shows A-paced rhythm. EKG reviewed by Dr. Henao.    Procedure(s) (LRB):  ABLATION, ARRHYTHMOGENIC FOCUS, FOR ATRIAL FIBRILLATION (N/A)     Indwelling Lines/Drains at time of discharge:  Lines/Drains/Airways          None        Hospital Course: Patient underwent PVI ablation (see procedure note for details), tolerated procedure well with no acute complications. Post procedure EKG showed a-paced rhythm at 60 bpm. Monitored overnight with no events. Bilateral groins with dressings intact, sites C/D/I. Dressings removed bilaterally, no bleeding, hematoma, or bruit noted. Patient ambulating, tolerating PO intake, and voiding with no issues. She denies any chest pain or SOB. Patient seen by Dr. Henao. Discharge plans discussed with Dr. Henao. Patient to continue all home medications. Already on PPI. Take amiodarone 200 mg daily (patient stated she was taking 400 mg daily-discussed with Dr. Henao wants patient to take 200 mg daily). Follow up with Dr. Henao in 6 weeks. Discharge plans/instructions discussed with patient and family who verbalized understanding and agreement of plans of care. No further questions or concerns voiced at this time. VSS. A-paced on telemetry HR 60s.    Physical Exam   Constitutional: She is oriented to person, place, and time. She appears well-developed and well-nourished. No distress.   HENT:   Head: Normocephalic and atraumatic.   Mouth/Throat: No oropharyngeal exudate.   Eyes: Pupils are equal, round, and reactive to light. Conjunctivae are normal. Right eye exhibits no discharge. Left eye exhibits no discharge.   Neck: Normal range of motion. Neck supple.   Cardiovascular: Normal rate, regular rhythm, S1 normal,  S2 normal, normal heart sounds, intact distal pulses and normal pulses.  No extrasystoles are present. PMI is not displaced. Exam reveals no gallop and no friction rub.   No murmur heard.  Pulses:       Radial pulses are 2+ on the right side, and 2+ on the left side.        Dorsalis pedis pulses are 2+ on the right side, and 2+ on the left side.   Pulmonary/Chest: Effort normal and breath sounds normal. No accessory muscle usage. No respiratory distress. She has no decreased breath sounds. She has no wheezes. She has no rhonchi. She has no rales. She exhibits no tenderness.   Left chest wall device site in good condition.   Abdominal: Soft. Bowel sounds are normal. She exhibits no distension and no mass. There is no tenderness. There is no rebound and no guarding.   Musculoskeletal: Normal range of motion. She exhibits no edema. Bilateral groin sites C/D/I, no bleeding, hematoma, or bruit noted.    Neurological: She is alert and oriented to person, place, and time. She has normal strength. She is not disoriented. No cranial nerve deficit or sensory deficit. She exhibits normal muscle tone. Coordination and gait normal.   Skin: Skin is warm and dry. No rash noted. She is not diaphoretic. No erythema.   Psychiatric: She has a normal mood and affect. Her behavior is normal. Judgment and thought content normal.   Nursing note and vitals reviewed.    Consults:   Anesthesia.    Significant Diagnostic Studies: Outside labs and labs throughout hospital stay reviewed.   Final Active Diagnoses:    Diagnosis Date Noted POA    PRINCIPAL PROBLEM:  Persistent atrial fibrillation [I48.1] 01/19/2018 Yes      Problems Resolved During this Admission:       Discharged Condition: stable    Disposition: Home or Self Care    Follow Up:  Follow-up Information     Dennys Henao MD In 6 weeks.    Specialties:  Electrophysiology, Cardiovascular Disease, Cardiology  Why:  s/p PVI RFA  Contact information:  [address]  [facility]  [id number]  [phone]                 Patient Instructions:      Diet Cardiac     No driving until:   Order Comments: No driving or operating heavy machinery for 24-48 hours after your procedure because you received sedation.     Other restrictions (specify):   Order Comments: 1. Do not strain or lift anything greater than 5 lb for 1 week. No bending or strenuous activity for 1 week.  2. Do not drive or operate any dangerous machinery for 24-48 hours.   3. After 24 hours, a shower is allowed. No dressings needed to your groin sites.    5. Clean the area with mild soap and water.   6. Once the skin has healed (in 1 week), bathing in a tub or swimming is allowed.   7. Inspect the groin site daily and report to the physician any signs of infection at the site: redness, pain, fever >100.4, unusual pain at the access site or affected extremity, unusual swelling at the access site, or any yellow, white or green drainage.  Call 911 if you have:   Bleeding from the puncture site that you cannot stop by doing the following:   Relax and lie down right away. Keep your leg flat and apply firm pressure to the site using your fingers and a gauze pad. Keep the pressure on for 10 minutes. Continue this until the bleeding stops. This may take awhile. When bleeding stops, cover the site with a sterile bandage and keep your leg still as much as possible.  8. Follow up with Dr. Henao in 6 week.  9. Continue all of your home medications. Take Amiodarone 200 mg (1 tablet) once daily.     Lifting restrictions   Order Comments: Nothing greater than 5 pounds for 1 week.     Notify your health care provider if you experience any of the following:   Order Comments: For any concerning medical symptoms.     Notify your health care provider if you experience any of the following:  increased confusion or weakness     Notify your health care provider if you experience any of the following:  persistent dizziness, light-headedness, or visual  disturbances     Notify your health care provider if you experience any of the following:  worsening rash     Notify your health care provider if you experience any of the following:  severe persistent headache     Notify your health care provider if you experience any of the following:  difficulty breathing or increased cough     Notify your health care provider if you experience any of the following:  redness, tenderness, or signs of infection (pain, swelling, redness, odor or green/yellow discharge around incision site)     Notify your health care provider if you experience any of the following:  severe uncontrolled pain     Notify your health care provider if you experience any of the following:  persistent nausea and vomiting or diarrhea     Notify your health care provider if you experience any of the following:  temperature >100.4     No dressing needed     Shower on day dressing removed (No bath)     Medications:  Reconciled Home Medications:      Medication List      CONTINUE taking these medications    amiodarone 200 MG Tab  Commonly known as:  PACERONE  Take 200 mg by mouth once daily.     amLODIPine 10 MG tablet  Commonly known as:  NORVASC  Take 10 mg by mouth once daily.     aspirin 81 MG EC tablet  Commonly known as:  ECOTRIN  Take 81 mg by mouth nightly.     CALCIUM 600 WITH VITAMIN D3 ORAL  Take by mouth nightly.     glipiZIDE 10 MG tablet  Commonly known as:  GLUCOTROL  Take 10 mg by mouth nightly.     ibuprofen 400 MG tablet  Commonly known as:  ADVIL,MOTRIN  Take 1 tablet (400 mg total) by mouth every 6 (six) hours as needed for Pain (chest discomfort).     JANUMET XR 50-1,000 mg Tm24  Generic drug:  SITagliptan-metformin     levothyroxine 75 MCG tablet  Commonly known as:  SYNTHROID  Take 75 mcg by mouth once daily.     magnesium oxide 400 mg (241.3 mg magnesium) tablet  Commonly known as:  MAG-OX  Take 400 mg by mouth nightly.     metoprolol succinate 25 MG 24 hr tablet  Commonly known as:   TOPROL-XL  Take 3 tablets (75 mg total) by mouth once daily.     omeprazole 20 MG capsule  Commonly known as:  PRILOSEC  Take 1 capsule (20 mg total) by mouth once daily. May resume after protonix/pantoprazole prescription completed     pantoprazole 40 MG tablet  Commonly known as:  PROTONIX  Take 1 tablet (40 mg total) by mouth once daily.     potassium gluconate 595 mg (99 mg) Tbsr  Take by mouth nightly.     pravastatin 40 MG tablet  Commonly known as:  PRAVACHOL  Take 40 mg by mouth nightly.     VITAMIN D2 50,000 unit Cap  Generic drug:  ergocalciferol  Take 50,000 Units by mouth every 7 days.     XARELTO 20 mg Tab  Generic drug:  rivaroxaban  Take 20 mg by mouth daily with dinner or evening meal.          Plan:  -Continue all home medications.   -Follow up with Dr. Henao in 6 weeks.    Time spent on the discharge of patient: 24 minutes    Colette Guzman NP  Cardiac Electrophysiology  [facility]    Attending: Dennys Henao MD

## 2019-04-03 NOTE — NURSING TRANSFER
Nursing Transfer Note      4/3/2019     Transfer To: [id number]    Transfer via stretcher    Transfer with cardiac monitoring    Transported by escort    Medicines sent: none    Chart send with patient: Yes    Notified: sister in law     Patient reassessed at: 4/3/2019 see flowsheet  (date, time)

## 2019-04-03 NOTE — ANESTHESIA POSTPROCEDURE EVALUATION
Anesthesia Post Evaluation    Patient: Sayda Mccartney    Procedure(s) Performed: Procedure(s) (LRB):  ABLATION, ARRHYTHMOGENIC FOCUS, FOR ATRIAL FIBRILLATION (N/A)    Final Anesthesia Type: general  Patient location during evaluation: PACU  Patient participation: Yes- Able to Participate  Level of consciousness: awake and alert  Post-procedure vital signs: reviewed and stable  Pain management: adequate  Airway patency: patent  PONV status at discharge: No PONV  Anesthetic complications: no      Cardiovascular status: blood pressure returned to baseline  Respiratory status: unassisted  Hydration status: euvolemic  Follow-up not needed.          Vitals Value Taken Time   /58 4/2/2019  7:47 PM   Temp 36.4 °C (97.5 °F) 4/2/2019  5:30 PM   Pulse 60 4/2/2019  8:01 PM   Resp 24 4/2/2019  8:01 PM   SpO2 99 % 4/2/2019  8:01 PM   Vitals shown include unvalidated device data.      No case tracking events are documented in the log.      Pain/Haseeb Score: Pain Rating Prior to Med Admin: 5 (4/2/2019  6:20 PM)  Pain Rating Post Med Admin: 4 (4/2/2019  6:30 PM)  Haseeb Score: 10 (4/2/2019  7:30 PM)

## 2019-04-10 LAB
POC ACTIVATED CLOTTING TIME K: 246 SEC (ref 74–137)
POC ACTIVATED CLOTTING TIME K: 290 SEC (ref 74–137)
POC ACTIVATED CLOTTING TIME K: 362 SEC (ref 74–137)
POC ACTIVATED CLOTTING TIME K: 400 SEC (ref 74–137)
POC ACTIVATED CLOTTING TIME K: 400 SEC (ref 74–137)
POC ACTIVATED CLOTTING TIME K: 406 SEC (ref 74–137)
POC ACTIVATED CLOTTING TIME K: 439 SEC (ref 74–137)
POC ACTIVATED CLOTTING TIME K: 450 SEC (ref 74–137)
SAMPLE: ABNORMAL

## 2019-04-30 RX ORDER — METOPROLOL SUCCINATE 25 MG/1
75 TABLET, EXTENDED RELEASE ORAL DAILY
Qty: 270 TABLET | Refills: 3 | Status: SHIPPED | OUTPATIENT
Start: 2019-04-30

## 2019-05-14 ENCOUNTER — TELEPHONE (OUTPATIENT)
Dept: ELECTROPHYSIOLOGY | Facility: CLINIC | Age: 79
End: 2019-05-14

## 2019-05-14 NOTE — TELEPHONE ENCOUNTER
Called pt & scheduled GP f/u apt 6/21/19 11:40AM. Verified pts address & adv I am mailing apt reminder ltr to her.  Pt confirmed.

## 2019-05-14 NOTE — TELEPHONE ENCOUNTER
----- Message from Rose Gama RN sent at 5/14/2019 12:13 PM CDT -----  Contact: Pt called       ----- Message -----  From: Tara Hinds  Sent: 5/14/2019  11:07 AM  To: Rose Gama RN    Pt calling to schedule 6 week f/u for procedure she had with Dr. Henao. Please call pt @ [phone]. Thank you.

## 2019-06-20 PROBLEM — Z86.79 S/P ABLATION OF ATRIAL FLUTTER: Status: ACTIVE | Noted: 2019-06-20

## 2019-06-20 PROBLEM — Z98.890 S/P ABLATION OF ATRIAL FLUTTER: Status: ACTIVE | Noted: 2019-06-20

## 2019-06-20 NOTE — PROGRESS NOTES
Subjective:     [facility]    Cardiologist: Marc Olivares MD    I had the pleasure of seeing Sayda Mccartney in follow-up for her history of atrial arrhythmias. She is an 79 year old female with a history of HTN, HLD, DM2, and sick sinus syndrome status-post [employer] dual chamber pacemaker implantation in 7/2008. Her AF history dates back prior to her pacemaker implantation, when she would have episodes of paroxysmal AF with RVR which would then revert to profound sinus bradycardia. She had been on Sotalol 160 mg bid for a number of years. She would have rare episodes lasting minutes up until recently, but then had three cardioversions for persistent AF, including two in 1/2018 which were two days apart. Sotalol was changed to Amiodarone after the last DCCV. She is in Xarelto for stroke prevention.    At her initial office visit, I reviewed a device check dated 12/19/2017 which showed she had a <1% AF burden since 5/4/2017. Episodes would last on average several minutes, with the longest episode lasting 30 minutes.    Recent cardiac studies include an echo done in 1/2018 which showed an EF of 40% with concentric LVH, and no significant valvular disease.    At her initial office visit, I recommended DCCV after her Amiodarone load was completed. A DCCV was performed, but she reverted to AF within 5 days.    In 3/2018, a PVI was perfomed. In addition to PVI, three ATs were successfully targeted during the case (roof AT adjacent to the LSPV, AT arising from the anterior base of the CIARA, mid-septal AT), with ablation of the septal AT restoring sinus rhythm. She was discharged on Amiodarone.     In 4/2018, Ms. Mccartney presented to [facility] with dizziness and near syncope, and was found to be in atrial flutter with RVR at 113 bpm. She spontaneously converted to sinus rhythm. At a visit with me several days later, she was in atrial tachycardia  ms. Toprol XL was increased, and she underwent DCCV on 5/16/2018. A device check  performed on 6/19/2018 showed no further AF episodes since her DCCV. She remained on Xarelto and Amiodarone. At her 9/2018 visit, a limited device interrogation showed no AF since her last visit. Amiodarone was dropped to 100 mg daily. In 11/2018, Ms. Mccartney presented to [facility] in AF, and was cardioverted. Amiodarone was increased back to 200 mg daily. I reviewed her presenting ECG which showed atrial flutter (atypical) at 137 bpm. In 1/2019, Ms. Mccartney presented to [facility] with palpitations and chest pain, and was found to be in atrial tachycardia at 116 bpm. She spontaneously reverted to sinus rhythm. Amiodarone was increased to 400 mg bid. Three days following discharge she had another episode of AF lasting 1 day.     On 4/2/2019, a re-do PVI was performed. All four PVs were isolated from the original procedure. An LA posterior wall isolation was performed. Two ATs were also targeted (CIARA and anterior CS os), as was CTI-flutter. She was discharged on Amiodarone 200 mg daily. Ms. Mccartney noted some high HRs in late May (up to 100 bpm), but has had no issues for the past 2 weeks.    My interpretation of today's ECG is atrial pacing at 60 bpm.    Review of Systems   Constitution: Negative for decreased appetite, malaise/fatigue, weight gain and weight loss.   HENT: Negative for sore throat.    Eyes: Negative for blurred vision.   Cardiovascular: Negative for chest pain, dyspnea on exertion, irregular heartbeat, leg swelling, near-syncope, orthopnea, palpitations, paroxysmal nocturnal dyspnea and syncope.   Respiratory: Negative for shortness of breath.    Skin: Negative for rash.   Musculoskeletal: Negative for arthritis.   Gastrointestinal: Negative for abdominal pain.   Neurological: Negative for focal weakness.   Psychiatric/Behavioral: Negative for altered mental status.        Objective:    Physical Exam   Constitutional: She is oriented to person, place, and time. She appears well-developed and well-nourished.  No distress.   HENT:   Head: Normocephalic and atraumatic.   Mouth/Throat: Oropharynx is clear and moist.   Eyes: Pupils are equal, round, and reactive to light. Conjunctivae are normal. No scleral icterus.   Neck: Normal range of motion. Neck supple. No JVD present. No thyromegaly present.   Cardiovascular: Normal rate, regular rhythm, normal heart sounds and intact distal pulses. Exam reveals no gallop and no friction rub.   No murmur heard.  Pulmonary/Chest: Effort normal and breath sounds normal. No respiratory distress.   Abdominal: Soft. Bowel sounds are normal. She exhibits no distension.   Musculoskeletal: She exhibits no edema.   Neurological: She is alert and oriented to person, place, and time.   Skin: Skin is warm and dry.   Psychiatric: She has a normal mood and affect. Her behavior is normal.   Vitals reviewed.        Assessment:       1. Persistent atrial fibrillation    2. S/P ablation of atrial fibrillation    3. Atrial tachycardia    4. S/P ablation of atrial flutter    5. History of cardioversion    6. Sick sinus syndrome    7. Cardiac pacemaker in situ    8. Essential hypertension    9. Dyslipidemia    10. Current use of anticoagulant therapy    11. Long term current use of antiarrhythmic drug    12. Diabetes mellitus with coincident hypertension         Plan:     In summary, Sayda Mccartney is an 79 year old female with a history of sick sinus syndrome, pacemaker implantation, and symptomatic persistent AF. She failed Sotalol and Amiodarone. She underwent PVI in 3/2018, and re-do PVI/LA posterior wall isolation/AT ablationx2/AFL ablation in 5/2019. Ms. Mccartney is doing much better from an AF standpoint. The plan is to hold the course, and see me again in 3 months. A device check will be performed at that point to assess AF burden.    Thank you for allowing me to participate in the care of this patient. Please do not hesitate to call me with any questions or concerns.

## 2019-06-21 ENCOUNTER — OFFICE VISIT (OUTPATIENT)
Dept: CARDIOLOGY | Facility: CLINIC | Age: 79
End: 2019-06-21
Payer: MEDICARE

## 2019-06-21 DIAGNOSIS — I49.5 SICK SINUS SYNDROME: ICD-10-CM

## 2019-06-21 DIAGNOSIS — Z79.01 CURRENT USE OF ANTICOAGULANT THERAPY: ICD-10-CM

## 2019-06-21 DIAGNOSIS — E78.5 DYSLIPIDEMIA: ICD-10-CM

## 2019-06-21 DIAGNOSIS — Z79.899 LONG TERM CURRENT USE OF ANTIARRHYTHMIC DRUG: ICD-10-CM

## 2019-06-21 DIAGNOSIS — Z98.890 S/P ABLATION OF ATRIAL FLUTTER: ICD-10-CM

## 2019-06-21 DIAGNOSIS — Z86.79 S/P ABLATION OF ATRIAL FLUTTER: ICD-10-CM

## 2019-06-21 DIAGNOSIS — Z95.0 CARDIAC PACEMAKER IN SITU: ICD-10-CM

## 2019-06-21 DIAGNOSIS — E11.9 DIABETES MELLITUS WITH COINCIDENT HYPERTENSION: ICD-10-CM

## 2019-06-21 DIAGNOSIS — Z98.890 S/P ABLATION OF ATRIAL FIBRILLATION: ICD-10-CM

## 2019-06-21 DIAGNOSIS — I10 ESSENTIAL HYPERTENSION: ICD-10-CM

## 2019-06-21 DIAGNOSIS — I47.19 ATRIAL TACHYCARDIA: ICD-10-CM

## 2019-06-21 DIAGNOSIS — Z92.89 HISTORY OF CARDIOVERSION: ICD-10-CM

## 2019-06-21 DIAGNOSIS — I10 DIABETES MELLITUS WITH COINCIDENT HYPERTENSION: ICD-10-CM

## 2019-06-21 DIAGNOSIS — I48.19 PERSISTENT ATRIAL FIBRILLATION: Primary | ICD-10-CM

## 2019-06-21 DIAGNOSIS — Z86.79 S/P ABLATION OF ATRIAL FIBRILLATION: ICD-10-CM

## 2019-06-21 PROCEDURE — 99214 PR OFFICE/OUTPT VISIT, EST, LEVL IV, 30-39 MIN: ICD-10-PCS | Mod: ,,, | Performed by: INTERNAL MEDICINE

## 2019-06-21 PROCEDURE — 99214 OFFICE O/P EST MOD 30 MIN: CPT | Mod: ,,, | Performed by: INTERNAL MEDICINE

## 2019-09-18 PROBLEM — I48.91 ATRIAL FIBRILLATION: Status: RESOLVED | Noted: 2019-04-01 | Resolved: 2019-09-18

## 2019-09-18 NOTE — PROGRESS NOTES
Subjective:     hospitals    Cardiologist: Marc Olivares MD    I had the pleasure of seeing Sayda Mccartney in follow-up for her history of atrial arrhythmias. She is an 79 year old female with a history of HTN, HLD, DM2, and sick sinus syndrome status-post [employer] dual chamber pacemaker implantation in 7/2008. Her AF history dates back prior to her pacemaker implantation, when she would have episodes of paroxysmal AF with RVR which would then revert to profound sinus bradycardia. She had been on Sotalol 160 mg bid for a number of years. She would have rare episodes lasting minutes up until recently, but then had three cardioversions for persistent AF, including two in 1/2018 which were two days apart. Sotalol was changed to Amiodarone after the last DCCV. She is in Xarelto for stroke prevention.    At her initial office visit, I reviewed a device check dated 12/19/2017 which showed she had a <1% AF burden since 5/4/2017. Episodes would last on average several minutes, with the longest episode lasting 30 minutes.    Recent cardiac studies include an echo done in 1/2018 which showed an EF of 40% with concentric LVH, and no significant valvular disease.    At her initial office visit, I recommended DCCV after her Amiodarone load was completed. A DCCV was performed, but she reverted to AF within 5 days.    In 3/2018, a PVI was perfomed. In addition to PVI, three ATs were successfully targeted during the case (roof AT adjacent to the LSPV, AT arising from the anterior base of the CIARA, mid-septal AT), with ablation of the septal AT restoring sinus rhythm. She was discharged on Amiodarone.     In 4/2018, Ms. Mccartney presented to [facility] with dizziness and near syncope, and was found to be in atrial flutter with RVR at 113 bpm. She spontaneously converted to sinus rhythm. At a visit with me several days later, she was in atrial tachycardia  ms. Toprol XL was increased, and she underwent DCCV on 5/16/2018. A device check  performed on 6/19/2018 showed no further AF episodes since her DCCV. She remained on Xarelto and Amiodarone. At her 9/2018 visit, a limited device interrogation showed no AF since her last visit. Amiodarone was dropped to 100 mg daily. In 11/2018, Ms. Mccartney presented to [facility] in AF, and was cardioverted. Amiodarone was increased back to 200 mg daily. I reviewed her presenting ECG which showed atrial flutter (atypical) at 137 bpm. In 1/2019, Ms. Mccartney presented to [facility] with palpitations and chest pain, and was found to be in atrial tachycardia at 116 bpm. She spontaneously reverted to sinus rhythm. Amiodarone was increased to 400 mg bid. Three days following discharge she had another episode of AF lasting 1 day.     On 4/2/2019, a re-do PVI was performed. All four PVs were isolated from the original procedure. An LA posterior wall isolation was performed. Two ATs were also targeted (CIARA and anterior CS os), as was CTI-flutter. She was discharged on Amiodarone 200 mg daily. Ms. Mccartney noted some high HRs in late May (up to 100 bpm), but by 6/2019 had no recurrent arrhythmias. The plan at that point was to continue Amiodarone. Today in the office TREVOR Mccartney denies recurrent AF. A device check confirms this finding.    My interpretation of today's ECG is atrial pacing at 60 bpm.    Review of Systems   Constitution: Positive for malaise/fatigue. Negative for decreased appetite, weight gain and weight loss.   HENT: Negative for sore throat.    Eyes: Negative for blurred vision.   Cardiovascular: Positive for dyspnea on exertion. Negative for chest pain, irregular heartbeat, leg swelling, near-syncope, orthopnea, palpitations, paroxysmal nocturnal dyspnea and syncope.   Respiratory: Negative for shortness of breath.    Skin: Negative for rash.   Musculoskeletal: Negative for arthritis.   Gastrointestinal: Negative for abdominal pain.   Neurological: Negative for focal weakness.   Psychiatric/Behavioral: Negative for  altered mental status.        Objective:    Physical Exam   Constitutional: She is oriented to person, place, and time. She appears well-developed and well-nourished. No distress.   HENT:   Head: Normocephalic and atraumatic.   Mouth/Throat: Oropharynx is clear and moist.   Eyes: Pupils are equal, round, and reactive to light. Conjunctivae are normal. No scleral icterus.   Neck: Normal range of motion. Neck supple. No JVD present. No thyromegaly present.   Cardiovascular: Normal rate, regular rhythm, normal heart sounds and intact distal pulses. Exam reveals no gallop and no friction rub.   No murmur heard.  Pulmonary/Chest: Effort normal and breath sounds normal. No respiratory distress.   Abdominal: Soft. Bowel sounds are normal. She exhibits no distension.   Musculoskeletal: She exhibits no edema.   Neurological: She is alert and oriented to person, place, and time.   Skin: Skin is warm and dry.   Psychiatric: She has a normal mood and affect. Her behavior is normal.   Vitals reviewed.        Assessment:       1. Sick sinus syndrome    2. Cardiac pacemaker in situ    3. Essential hypertension    4. Dyslipidemia    5. S/P ablation of atrial fibrillation    6. Atrial tachycardia    7. S/P ablation of atrial flutter    8. Persistent atrial fibrillation    9. History of cardioversion    10. Current use of anticoagulant therapy    11. Diabetes mellitus with coincident hypertension    12. Long term current use of antiarrhythmic drug         Plan:     In summary, Sayda Mccartney is an 79 year old female with a history of sick sinus syndrome, pacemaker implantation, and symptomatic persistent AF. She failed Sotalol and Amiodarone. She underwent PVI in 3/2018, and re-do PVI/LA posterior wall isolation/AT ablationx2/AFL ablation in 5/2019. Ms. Mccartney is doing much better from an AF standpoint. Today's device check shows no recurrent AF over the past 3 months. The plan is to cut Amiodarone to 100 mg daily, and see me  again in 6 months.    She should continue Xarelto for stroke prophylaxis.    Thank you for allowing me to participate in the care of this patient. Please do not hesitate to call me with any questions or concerns.

## 2019-09-20 ENCOUNTER — OFFICE VISIT (OUTPATIENT)
Dept: CARDIOLOGY | Facility: CLINIC | Age: 79
End: 2019-09-20
Payer: MEDICARE

## 2019-09-20 DIAGNOSIS — Z79.01 CURRENT USE OF ANTICOAGULANT THERAPY: ICD-10-CM

## 2019-09-20 DIAGNOSIS — I10 ESSENTIAL HYPERTENSION: ICD-10-CM

## 2019-09-20 DIAGNOSIS — Z86.79 S/P ABLATION OF ATRIAL FIBRILLATION: ICD-10-CM

## 2019-09-20 DIAGNOSIS — I10 DIABETES MELLITUS WITH COINCIDENT HYPERTENSION: ICD-10-CM

## 2019-09-20 DIAGNOSIS — I49.5 SICK SINUS SYNDROME: Primary | ICD-10-CM

## 2019-09-20 DIAGNOSIS — E11.9 DIABETES MELLITUS WITH COINCIDENT HYPERTENSION: ICD-10-CM

## 2019-09-20 DIAGNOSIS — Z98.890 S/P ABLATION OF ATRIAL FLUTTER: ICD-10-CM

## 2019-09-20 DIAGNOSIS — I47.19 ATRIAL TACHYCARDIA: ICD-10-CM

## 2019-09-20 DIAGNOSIS — Z98.890 S/P ABLATION OF ATRIAL FIBRILLATION: ICD-10-CM

## 2019-09-20 DIAGNOSIS — I48.19 PERSISTENT ATRIAL FIBRILLATION: ICD-10-CM

## 2019-09-20 DIAGNOSIS — Z79.899 LONG TERM CURRENT USE OF ANTIARRHYTHMIC DRUG: ICD-10-CM

## 2019-09-20 DIAGNOSIS — Z86.79 S/P ABLATION OF ATRIAL FLUTTER: ICD-10-CM

## 2019-09-20 DIAGNOSIS — Z95.0 CARDIAC PACEMAKER IN SITU: ICD-10-CM

## 2019-09-20 DIAGNOSIS — Z92.89 HISTORY OF CARDIOVERSION: ICD-10-CM

## 2019-09-20 DIAGNOSIS — E78.5 DYSLIPIDEMIA: ICD-10-CM

## 2019-09-20 PROCEDURE — 99214 PR OFFICE/OUTPT VISIT, EST, LEVL IV, 30-39 MIN: ICD-10-PCS | Mod: ,,, | Performed by: INTERNAL MEDICINE

## 2019-09-20 PROCEDURE — 99214 OFFICE O/P EST MOD 30 MIN: CPT | Mod: ,,, | Performed by: INTERNAL MEDICINE

## 2019-10-17 ENCOUNTER — TELEPHONE (OUTPATIENT)
Dept: ELECTROPHYSIOLOGY | Facility: CLINIC | Age: 79
End: 2019-10-17

## 2019-10-17 NOTE — TELEPHONE ENCOUNTER
Pt had device checked at Dr Bahena office and Jeannette made program changes to track record.  Called to check on pt. She reports feeling better for a little while but is worried it won't last.. Advised I would call in the morning to see how she is feeling. Dr Henao may have to make medication changes if pt still having problems with HR.

## 2019-10-17 NOTE — TELEPHONE ENCOUNTER
Received call from pt. She stated that she saw Dr Henao in September and he decreased her amiodarone to 100 mg QD. For the last 3 days her HR has been 120 and greater.  Advised that we needed her to send a transmission. She stated she has to go to Dr Grider's office for them to interrogate. Advised I would call and see if she could have done today.    Spoke with Jeannette at Dr Grider's office. Pt can go before 11 am or after 1:30 pm. Asked if she could call with results once completed.    Called pt to advise. She will go for 1:30 pm. She stated she lives 20 miles away and couldn't make it before 11 am.

## 2020-10-17 NOTE — TELEPHONE ENCOUNTER
Called pt to review prior medical history, medications and allergies prior to apt in AM. No answer. Left voicemail for pt to call back to clinic.   
no

## 2021-11-18 RX ORDER — DILTIAZEM HYDROCHLORIDE 120 MG/1
120 CAPSULE, COATED, EXTENDED RELEASE ORAL DAILY
COMMUNITY

## 2021-11-19 ENCOUNTER — OFFICE VISIT (OUTPATIENT)
Dept: CARDIOLOGY | Facility: CLINIC | Age: 81
End: 2021-11-19
Payer: MEDICARE

## 2021-11-19 DIAGNOSIS — E78.5 DYSLIPIDEMIA: ICD-10-CM

## 2021-11-19 DIAGNOSIS — Z79.01 CURRENT USE OF ANTICOAGULANT THERAPY: ICD-10-CM

## 2021-11-19 DIAGNOSIS — Z98.890 S/P ABLATION OF ATRIAL FLUTTER: ICD-10-CM

## 2021-11-19 DIAGNOSIS — Z92.89 HISTORY OF CARDIOVERSION: ICD-10-CM

## 2021-11-19 DIAGNOSIS — Z98.890 S/P ABLATION OF ATRIAL FIBRILLATION: ICD-10-CM

## 2021-11-19 DIAGNOSIS — Z95.0 CARDIAC PACEMAKER IN SITU: ICD-10-CM

## 2021-11-19 DIAGNOSIS — Z86.79 S/P ABLATION OF ATRIAL FLUTTER: ICD-10-CM

## 2021-11-19 DIAGNOSIS — I49.5 SICK SINUS SYNDROME: ICD-10-CM

## 2021-11-19 DIAGNOSIS — I10 ESSENTIAL HYPERTENSION: ICD-10-CM

## 2021-11-19 DIAGNOSIS — Z86.79 S/P ABLATION OF ATRIAL FIBRILLATION: ICD-10-CM

## 2021-11-19 DIAGNOSIS — I48.19 PERSISTENT ATRIAL FIBRILLATION: Primary | ICD-10-CM

## 2021-11-19 PROCEDURE — 99214 PR OFFICE/OUTPT VISIT, EST, LEVL IV, 30-39 MIN: ICD-10-PCS | Mod: ,,, | Performed by: INTERNAL MEDICINE

## 2021-11-19 PROCEDURE — 99214 OFFICE O/P EST MOD 30 MIN: CPT | Mod: ,,, | Performed by: INTERNAL MEDICINE

## 2022-05-19 NOTE — PROGRESS NOTES
Subjective:     [facility]    Cardiologist: Marc Olivares MD    I had the pleasure of seeing Sayda Mccartney in follow-up for her history of atrial arrhythmias. She was last seen by me in 11/2021. She is an 81 year old female with HTN, HLD, DM2, and sick sinus syndrome status-post [employer] dual chamber pacemaker implantation in 7/2008. Her AF history dates back prior to her pacemaker implantation, when she would have episodes of paroxysmal AF with RVR which would then revert to profound sinus bradycardia. She had been on Sotalol 160 mg bid for a number of years. She would have rare episodes lasting minutes up until recently, but then had three cardioversions for persistent AF, including two in 1/2018 which were two days apart. Sotalol was changed to Amiodarone after the last DCCV. She is in Xarelto for stroke prevention.    At her initial office visit, I reviewed a device check dated 12/19/2017 which showed she had a <1% AF burden since 5/4/2017. Episodes would last on average several minutes, with the longest episode lasting 30 minutes.    Recent cardiac studies include an echo done in 1/2018 which showed an EF of 40% with concentric LVH, and no significant valvular disease.    At her initial office visit, I recommended DCCV after her Amiodarone load was completed. A DCCV was performed, but she reverted to AF within 5 days.    In 3/2018, a PVI was perfomed. In addition to PVI, three ATs were successfully targeted during the case (roof AT adjacent to the LSPV, AT arising from the anterior base of the CIARA, mid-septal AT), with ablation of the septal AT restoring sinus rhythm. She was discharged on Amiodarone.     In 4/2018, Ms. Mccartney presented to [facility] with dizziness and near syncope, and was found to be in atrial flutter with RVR at 113 bpm. She spontaneously converted to sinus rhythm. At a visit with me several days later, she was in atrial tachycardia  ms. Toprol XL was increased, and she underwent DCCV on  "5/16/2018. A device check performed on 6/19/2018 showed no further AF episodes since her DCCV. She remained on Xarelto and Amiodarone. At her 9/2018 visit, a limited device interrogation showed no AF since her last visit. Amiodarone was dropped to 100 mg daily. In 11/2018, Ms. Mccartney presented to [facility] in [place], and was cardioverted. Amiodarone was increased back to 200 mg daily. I reviewed her presenting ECG which showed atrial flutter (atypical) at 137 bpm. In 1/2019, Ms. Mccartney presented to [facility] with palpitations and chest pain, and was found to be in atrial tachycardia at 116 bpm. She spontaneously reverted to sinus rhythm. Amiodarone was increased to 400 mg bid. Three days following discharge she had another episode of AF lasting 1 day.     On 4/2/2019, a re-do PVI was performed. All four PVs were isolated from the original procedure. An LA posterior wall isolation was performed. Two ATs were also targeted (CIARA and anterior CS os), as was CTI-flutter. She was discharged on Amiodarone 200 mg daily. Ms. Mccartney noted some high HRs in late May (up to 100 bpm), but by 6/2019 had no recurrent arrhythmias. The plan at that point was to continue Amiodarone. At her visit in 9/2019, no recurrent AF was seen, and amiodarone was dropped to 100 mg daily. In 9/2020 Ms. Mccartney developed "amiodarone toxicity" (no details available to me at present) and stopped it at this time.    At her 11/2021 Ms. Mccartney had been seen twice over the past few months for "tachycardia" and was placed on sotalol 80 mg bid and Cartia 120 mg daily. A limited device check showed 1:1 AT at 140 bpm on 11/2 and 11/7, lasting 14h and 18h. An ECG from 11/8/2021 showed AT at 140 bpm. Plan at that time was to hold the course.    Today in the office Ms. Mccartney says her symptoms are significantly improved. She still feels occasional palpitations with exertion, but much improved. HRs no greater than 70-80s bpm.    My interpretation of today's ECG is " atrial pacing at 60 bpm with a QTc of 476 ms.    Review of Systems   Constitutional: Negative for decreased appetite, malaise/fatigue, weight gain and weight loss.   HENT: Negative for sore throat.    Eyes: Negative for blurred vision.   Cardiovascular: Negative for chest pain, dyspnea on exertion, irregular heartbeat, leg swelling, near-syncope, orthopnea, palpitations, paroxysmal nocturnal dyspnea and syncope.   Respiratory: Negative for shortness of breath.    Skin: Negative for rash.   Musculoskeletal: Negative for arthritis.   Gastrointestinal: Negative for abdominal pain.   Neurological: Negative for focal weakness.   Psychiatric/Behavioral: Negative for altered mental status.        Objective:    Physical Exam  Vitals reviewed.   Constitutional:       General: She is not in acute distress.     Appearance: She is well-developed.   HENT:      Head: Normocephalic and atraumatic.   Eyes:      General: No scleral icterus.     Conjunctiva/sclera: Conjunctivae normal.      Pupils: Pupils are equal, round, and reactive to light.   Neck:      Thyroid: No thyromegaly.      Vascular: No JVD.   Cardiovascular:      Rate and Rhythm: Normal rate and regular rhythm.      Pulses: Intact distal pulses.      Heart sounds: Normal heart sounds. No murmur heard.    No friction rub. No gallop.   Pulmonary:      Effort: Pulmonary effort is normal. No respiratory distress.      Breath sounds: Normal breath sounds.   Abdominal:      General: Bowel sounds are normal. There is no distension.      Palpations: Abdomen is soft.   Musculoskeletal:      Cervical back: Normal range of motion and neck supple.   Skin:     General: Skin is warm and dry.   Neurological:      Mental Status: She is alert and oriented to person, place, and time.   Psychiatric:         Behavior: Behavior normal.           Assessment:       1. Persistent atrial fibrillation    2. History of cardioversion    3. Long term current use of antiarrhythmic drug    4. Sick  sinus syndrome    5. Cardiac pacemaker in situ    6. Essential hypertension    7. S/P ablation of atrial fibrillation    8. Atrial tachycardia    9. S/P ablation of atrial flutter         Plan:       In summary, Sayda Mccartney is an 81 year old female with a history of sick sinus syndrome, pacemaker implantation, and symptomatic persistent AF. She failed Sotalol and Amiodarone. She underwent PVI in 3/2018, and re-do PVI/LA posterior wall isolation/AT ablationx2/AFL ablation in 5/2019. Ms. Mccartney has been on amiodarone 100 mg daily for the past 2 years, with no recurrent AF. However, in fall 2021 she had episodes of AT leading to ED visits, and was placed on sotalol and cartia. Symptoms markedly improved. The plan is to hold the course and see me again in 12 months. Should she have a significant increase in her arrhythmia burden we will discuss the option of AT ablation.    Thank you for allowing me to participate in the care of this patient. Please do not hesitate to call me with any questions or concerns.

## 2022-05-20 ENCOUNTER — OFFICE VISIT (OUTPATIENT)
Dept: CARDIOLOGY | Facility: CLINIC | Age: 82
End: 2022-05-20
Payer: MEDICARE

## 2022-05-20 DIAGNOSIS — I48.19 PERSISTENT ATRIAL FIBRILLATION: Primary | ICD-10-CM

## 2022-05-20 DIAGNOSIS — I49.5 SICK SINUS SYNDROME: ICD-10-CM

## 2022-05-20 DIAGNOSIS — Z92.89 HISTORY OF CARDIOVERSION: ICD-10-CM

## 2022-05-20 DIAGNOSIS — Z86.79 S/P ABLATION OF ATRIAL FLUTTER: ICD-10-CM

## 2022-05-20 DIAGNOSIS — Z95.0 CARDIAC PACEMAKER IN SITU: ICD-10-CM

## 2022-05-20 DIAGNOSIS — Z86.79 S/P ABLATION OF ATRIAL FIBRILLATION: ICD-10-CM

## 2022-05-20 DIAGNOSIS — Z79.899 LONG TERM CURRENT USE OF ANTIARRHYTHMIC DRUG: ICD-10-CM

## 2022-05-20 DIAGNOSIS — Z98.890 S/P ABLATION OF ATRIAL FLUTTER: ICD-10-CM

## 2022-05-20 DIAGNOSIS — I47.19 ATRIAL TACHYCARDIA: ICD-10-CM

## 2022-05-20 DIAGNOSIS — I10 ESSENTIAL HYPERTENSION: ICD-10-CM

## 2022-05-20 DIAGNOSIS — Z98.890 S/P ABLATION OF ATRIAL FIBRILLATION: ICD-10-CM

## 2022-05-20 PROCEDURE — 99214 PR OFFICE/OUTPT VISIT, EST, LEVL IV, 30-39 MIN: ICD-10-PCS | Mod: ,,, | Performed by: INTERNAL MEDICINE

## 2022-05-20 PROCEDURE — 99214 OFFICE O/P EST MOD 30 MIN: CPT | Mod: ,,, | Performed by: INTERNAL MEDICINE

## 2023-06-12 NOTE — PROGRESS NOTES
Subjective:     HPI    I had the pleasure of seeing Sayda Mccartney in follow-up for her history of atrial arrhythmias. She was last seen by me in 5/2022. She is an 83 year old female with HTN, HLD, DM2, and sick sinus syndrome status-post [employer] dual chamber pacemaker implantation in 7/2008. Her AF history dates back prior to her pacemaker implantation, when she would have episodes of paroxysmal AF with RVR which would then revert to profound sinus bradycardia. She had been on Sotalol 160 mg bid for a number of years. She would have rare episodes lasting minutes up until recently, but then had three cardioversions for persistent AF, including two in 1/2018 which were two days apart. Sotalol was changed to Amiodarone after the last DCCV. She is in Xarelto for stroke prevention.    At her initial office visit, I reviewed a device check dated 12/19/2017 which showed she had a <1% AF burden since 5/4/2017. Episodes would last on average several minutes, with the longest episode lasting 30 minutes.    Recent cardiac studies include an echo done in 1/2018 which showed an EF of 40% with concentric LVH, and no significant valvular disease.    At her initial office visit, I recommended DCCV after her Amiodarone load was completed. A DCCV was performed, but she reverted to AF within 5 days.    In 3/2018, a PVI was perfomed. In addition to PVI, three ATs were successfully targeted during the case (roof AT adjacent to the LSPV, AT arising from the anterior base of the CIARA, mid-septal AT), with ablation of the septal AT restoring sinus rhythm. She was discharged on Amiodarone.     In 4/2018, Ms. Mccartney presented to [facility] with dizziness and near syncope, and was found to be in atrial flutter with RVR at 113 bpm. She spontaneously converted to sinus rhythm. At a visit with me several days later, she was in atrial tachycardia  ms. Toprol XL was increased, and she underwent DCCV on 5/16/2018. A device check performed on  "6/19/2018 showed no further AF episodes since her DCCV. She remained on Xarelto and Amiodarone. At her 9/2018 visit, a limited device interrogation showed no AF since her last visit. Amiodarone was dropped to 100 mg daily. In 11/2018, Ms. Mccartney presented to [facility] in [place], and was cardioverted. Amiodarone was increased back to 200 mg daily. I reviewed her presenting ECG which showed atrial flutter (atypical) at 137 bpm. In 1/2019, Ms. Mccartney presented to [facility] with palpitations and chest pain, and was found to be in atrial tachycardia at 116 bpm. She spontaneously reverted to sinus rhythm. Amiodarone was increased to 400 mg bid. Three days following discharge she had another episode of AF lasting 1 day.     On 4/2/2019, a re-do PVI was performed. All four PVs were isolated from the original procedure. An LA posterior wall isolation was performed. Two ATs were also targeted (CIARA and anterior CS os), as was CTI-flutter. She was discharged on Amiodarone 200 mg daily. Ms. Mccartney noted some high HRs in late May (up to 100 bpm), but by 6/2019 had no recurrent arrhythmias. The plan at that point was to continue Amiodarone. At her visit in 9/2019, no recurrent AF was seen, and amiodarone was dropped to 100 mg daily. In 9/2020 Ms. Mccartney developed "amiodarone toxicity" (no details available to me at present) and stopped it at this time.    At her 11/2021 Ms. Mccartney had been seen twice over the past few months for "tachycardia" and was placed on sotalol 80 mg bid and Cartia 120 mg daily. A limited device check showed 1:1 AT at 140 bpm on 11/2 and 11/7, lasting 14h and 18h. An ECG from 11/8/2021 showed AT at 140 bpm. Plan at that time was to hold the course.    At her 5/2022 visit Ms. Mccartney stated her symptoms were significantly improved. She still felt occasional palpitations with exertion, but much improved. HRs no greater than 70-80s bpm. Plan at that time was to continue sotalol and cartia.    Today in the office Ms. " Bib describes intermittent AF. Reduced caffeine intake which has helped things. Today's pacemaker check shows 1.6% AF burden over the past year.     My interpretation of today's ECG is atrial pacing at 60 bpm with a QTc of 487 ms.    Review of Systems   Constitutional: Negative for decreased appetite, malaise/fatigue, weight gain and weight loss.   HENT:  Negative for sore throat.    Eyes:  Negative for blurred vision.   Cardiovascular:  Positive for palpitations. Negative for chest pain, dyspnea on exertion, irregular heartbeat, leg swelling, near-syncope, orthopnea, paroxysmal nocturnal dyspnea and syncope.   Respiratory:  Negative for shortness of breath.    Skin:  Negative for rash.   Musculoskeletal:  Negative for arthritis.   Gastrointestinal:  Negative for abdominal pain.   Neurological:  Negative for focal weakness.   Psychiatric/Behavioral:  Negative for altered mental status.       Objective:    Physical Exam  Vitals reviewed.   Constitutional:       General: She is not in acute distress.     Appearance: She is well-developed.   HENT:      Head: Normocephalic and atraumatic.   Eyes:      General: No scleral icterus.     Conjunctiva/sclera: Conjunctivae normal.      Pupils: Pupils are equal, round, and reactive to light.   Neck:      Thyroid: No thyromegaly.      Vascular: No JVD.   Cardiovascular:      Rate and Rhythm: Normal rate and regular rhythm.      Pulses: Intact distal pulses.      Heart sounds: Normal heart sounds. No murmur heard.    No friction rub. No gallop.   Pulmonary:      Effort: Pulmonary effort is normal. No respiratory distress.      Breath sounds: Normal breath sounds.   Abdominal:      General: Bowel sounds are normal. There is no distension.      Palpations: Abdomen is soft.   Musculoskeletal:      Cervical back: Normal range of motion and neck supple.   Skin:     General: Skin is warm and dry.   Neurological:      Mental Status: She is alert and oriented to person, place, and  time.   Psychiatric:         Behavior: Behavior normal.         Assessment:       1. Cardiac pacemaker in situ    2. S/P ablation of atrial fibrillation    3. Essential hypertension    4. Dyslipidemia         Plan:       In summary, Sayda Mccartney is an 83 year old female with a history of sick sinus syndrome, pacemaker implantation, and symptomatic persistent AF. She failed Sotalol and Amiodarone. She underwent PVI in 3/2018, and re-do PVI/LA posterior wall isolation/AT ablationx2/AFL ablation in 5/2019. Ms. Mccartney was then on amiodarone 100 mg daily for 2 years, with no recurrent AF. However, in fall 2021 she had episodes of AT leading to ED visits, and was placed on sotalol and cartia. Symptoms  initially improved, now a bit worse than last year. Overall her arrhythmias do not impact her quality of life.    AMS rate decreased from 130 bpm to 120 bpm at today's visit. Plan is to hold the course, and see me again in 12 months. Should she have a significant increase in her arrhythmia burden we will discuss the option of AT ablation.    Thank you for allowing me to participate in the care of this patient. Please do not hesitate to call me with any questions or concerns.

## 2023-06-16 ENCOUNTER — OFFICE VISIT (OUTPATIENT)
Dept: CARDIOLOGY | Facility: CLINIC | Age: 83
End: 2023-06-16
Payer: MEDICARE

## 2023-06-16 DIAGNOSIS — Z86.79 S/P ABLATION OF ATRIAL FIBRILLATION: ICD-10-CM

## 2023-06-16 DIAGNOSIS — Z95.0 CARDIAC PACEMAKER IN SITU: Primary | ICD-10-CM

## 2023-06-16 DIAGNOSIS — I10 ESSENTIAL HYPERTENSION: ICD-10-CM

## 2023-06-16 DIAGNOSIS — E78.5 DYSLIPIDEMIA: ICD-10-CM

## 2023-06-16 DIAGNOSIS — Z98.890 S/P ABLATION OF ATRIAL FIBRILLATION: ICD-10-CM

## 2023-06-16 PROCEDURE — 99214 OFFICE O/P EST MOD 30 MIN: CPT | Mod: GW,,, | Performed by: INTERNAL MEDICINE

## 2023-06-16 PROCEDURE — 99214 PR OFFICE/OUTPT VISIT, EST, LEVL IV, 30-39 MIN: ICD-10-PCS | Mod: GW,,, | Performed by: INTERNAL MEDICINE

## 2024-06-06 ENCOUNTER — TELEPHONE (OUTPATIENT)
Dept: ELECTROPHYSIOLOGY | Facility: CLINIC | Age: 84
End: 2024-06-06
Payer: MEDICARE

## 2024-08-08 PROBLEM — I50.20 SYSTOLIC CONGESTIVE HEART FAILURE, UNSPECIFIED HF CHRONICITY: Status: ACTIVE | Noted: 2024-08-08

## 2024-09-18 ENCOUNTER — TELEPHONE (OUTPATIENT)
Dept: ELECTROPHYSIOLOGY | Facility: CLINIC | Age: 84
End: 2024-09-18
Payer: MEDICARE

## 2024-09-24 ENCOUNTER — TELEPHONE (OUTPATIENT)
Dept: ELECTROPHYSIOLOGY | Facility: CLINIC | Age: 84
End: 2024-09-24
Payer: MEDICARE

## 2024-09-25 ENCOUNTER — TELEPHONE (OUTPATIENT)
Dept: ELECTROPHYSIOLOGY | Facility: CLINIC | Age: 84
End: 2024-09-25
Payer: MEDICARE

## 2024-11-19 NOTE — PROGRESS NOTES
Subjective:     HPI    I had the pleasure of seeing Sayda Mccartney in follow-up for her history of atrial arrhythmias. She was last seen by me in 6/2023. She is an 84 year old female with HTN, HLD, DM2, and sick sinus syndrome status-post [employer] dual chamber pacemaker implantation in 7/2008. Her AF history dates back prior to her pacemaker implantation, when she would have episodes of paroxysmal AF with RVR which would then revert to profound sinus bradycardia. She had been on Sotalol 160 mg bid for a number of years. She would have rare episodes lasting minutes up until recently, but then had three cardioversions for persistent AF, including two in 1/2018 which were two days apart. Sotalol was changed to Amiodarone after the last DCCV. She is in Xarelto for stroke prevention.    At her initial office visit, I reviewed a device check dated 12/19/2017 which showed she had a <1% AF burden since 5/4/2017. Episodes would last on average several minutes, with the longest episode lasting 30 minutes.    Recent cardiac studies include an echo done in 1/2018 which showed an EF of 40% with concentric LVH, and no significant valvular disease.    At her initial office visit, I recommended DCCV after her Amiodarone load was completed. A DCCV was performed, but she reverted to AF within 5 days.    In 3/2018, a PVI was perfomed. In addition to PVI, three ATs were successfully targeted during the case (roof AT adjacent to the LSPV, AT arising from the anterior base of the CIARA, mid-septal AT), with ablation of the septal AT restoring sinus rhythm. She was discharged on Amiodarone.     In 4/2018, Ms. Mccartney presented to [facility] with dizziness and near syncope, and was found to be in atrial flutter with RVR at 113 bpm. She spontaneously converted to sinus rhythm. At a visit with me several days later, she was in atrial tachycardia  ms. Toprol XL was increased, and she underwent DCCV on 5/16/2018. A device check performed on  "6/19/2018 showed no further AF episodes since her DCCV. She remained on Xarelto and Amiodarone. At her 9/2018 visit, a limited device interrogation showed no AF since her last visit. Amiodarone was dropped to 100 mg daily. In 11/2018, Ms. Mccartney presented to [facility] in AF, and was cardioverted. Amiodarone was increased back to 200 mg daily. I reviewed her presenting ECG which showed atrial flutter (atypical) at 137 bpm. In 1/2019, Ms. Mccartney presented to [facility] with palpitations and chest pain, and was found to be in atrial tachycardia at 116 bpm. She spontaneously reverted to sinus rhythm. Amiodarone was increased to 400 mg bid. Three days following discharge she had another episode of AF lasting 1 day.     On 4/2/2019, a re-do PVI was performed. All four PVs were isolated from the original procedure. An LA posterior wall isolation was performed. Two ATs were also targeted (CIARA and anterior CS os), as was CTI-flutter. She was discharged on Amiodarone 200 mg daily. Ms. Mccartney noted some high HRs in late May (up to 100 bpm), but by 6/2019 had no recurrent arrhythmias. The plan at that point was to continue Amiodarone. At her visit in 9/2019, no recurrent AF was seen, and amiodarone was dropped to 100 mg daily. In 9/2020 Ms. Mccartney developed "amiodarone toxicity" (no details available to me at present) and stopped it at this time.    At her 11/2021 Ms. Mccartney had been seen twice over the past few months for "tachycardia" and was placed on sotalol 80 mg bid and Cartia 120 mg daily. A limited device check showed 1:1 AT at 140 bpm on 11/2 and 11/7, lasting 14h and 18h. An ECG from 11/8/2021 showed AT at 140 bpm. Plan at that time was to hold the course.    At her 5/2022 visit Ms. Mccartney stated her symptoms were significantly improved. She still felt occasional palpitations with exertion, but much improved. HRs no greater than 70-80s bpm. Plan at that time was to continue sotalol and cartia.    At her 6/2023 visit Ms. " Bib described intermittent AF. Reduced caffeine intake which helped things. Pacemaker check showed 1.6% AF burden over the past year.  AMS rate decreased from 130 to 120 bpm at that visit.    Pt is on home O2 which she has been on for the past 2 years. Has been on steroids for COPD/pulmonary issues.. Remains on eliquis 2.5 mg bid, digoxin 0.125 mg daily, metoprolol succinate 50 mg, sotalol 80 mg bid,     I reviewed today's device check which shows stable device/lead function, RV pacing <1%, AF burden 5.6% since 5/2024, frequent nonsustained AT, battery 1.7 yrs.    My interpretation of today's ECG is atrial pacing at 60 bpm with a QTc of 428 ms.    Review of Systems   Constitutional: Negative for decreased appetite, malaise/fatigue, weight gain and weight loss.   HENT:  Negative for sore throat.    Eyes:  Negative for blurred vision.   Cardiovascular:  Positive for palpitations. Negative for chest pain, dyspnea on exertion, irregular heartbeat, leg swelling, near-syncope, orthopnea, paroxysmal nocturnal dyspnea and syncope.   Respiratory:  Negative for shortness of breath.    Skin:  Negative for rash.   Musculoskeletal:  Negative for arthritis.   Gastrointestinal:  Negative for abdominal pain.   Neurological:  Negative for focal weakness.   Psychiatric/Behavioral:  Negative for altered mental status.         Objective:    Physical Exam  Vitals reviewed.   Constitutional:       General: She is not in acute distress.     Appearance: She is well-developed.   HENT:      Head: Normocephalic and atraumatic.   Eyes:      General: No scleral icterus.     Conjunctiva/sclera: Conjunctivae normal.      Pupils: Pupils are equal, round, and reactive to light.   Neck:      Thyroid: No thyromegaly.      Vascular: No JVD.   Cardiovascular:      Rate and Rhythm: Normal rate and regular rhythm.      Pulses: Intact distal pulses.      Heart sounds: Normal heart sounds. No murmur heard.     No friction rub. No gallop.   Pulmonary:       Effort: Pulmonary effort is normal. No respiratory distress.      Breath sounds: Normal breath sounds.   Abdominal:      General: Bowel sounds are normal. There is no distension.      Palpations: Abdomen is soft.   Musculoskeletal:      Cervical back: Normal range of motion and neck supple.   Skin:     General: Skin is warm and dry.   Neurological:      Mental Status: She is alert and oriented to person, place, and time.   Psychiatric:         Behavior: Behavior normal.           Assessment:       1. S/P ablation of atrial fibrillation    2. Systolic congestive heart failure, unspecified HF chronicity    3. Sick sinus syndrome    4. S/P ablation of atrial flutter    5. Persistent atrial fibrillation    6. History of cardioversion    7. Cardiac pacemaker in situ         Plan:       In summary, Sayda Mccartney is an 84 year old female with a history of sick sinus syndrome, pacemaker implantation, and symptomatic persistent AF. She failed Sotalol and Amiodarone. She underwent PVI in 3/2018, and re-do PVI/LA posterior wall isolation/AT ablationx2/AFL ablation in 5/2019. Ms. Mccartney was then on amiodarone 100 mg daily for 2 years, with no recurrent AF. However, in fall 2021 she had episodes of AT leading to ED visits, and was placed on sotalol and cartia. Symptoms initially improved, then a bit worse than prior years. Overall her arrhythmias do not impact her quality of life. Seems like she is now off diltiazem.    Plan is to continue sotalol,increase toprol to 50 mg bid for salvos of nonsustained AT we are seeing on device check and ECG, continue eliquis, follow-up 1 year.    Thank you for allowing me to participate in the care of this patient. Please do not hesitate to call me with any questions or concerns.

## 2024-11-22 ENCOUNTER — OFFICE VISIT (OUTPATIENT)
Dept: CARDIOLOGY | Facility: CLINIC | Age: 84
End: 2024-11-22
Payer: MEDICARE

## 2024-11-22 DIAGNOSIS — Z98.890 S/P ABLATION OF ATRIAL FIBRILLATION: Primary | ICD-10-CM

## 2024-11-22 DIAGNOSIS — I50.20 SYSTOLIC CONGESTIVE HEART FAILURE, UNSPECIFIED HF CHRONICITY: ICD-10-CM

## 2024-11-22 DIAGNOSIS — Z86.79 S/P ABLATION OF ATRIAL FIBRILLATION: Primary | ICD-10-CM

## 2024-11-22 DIAGNOSIS — I48.19 PERSISTENT ATRIAL FIBRILLATION: ICD-10-CM

## 2024-11-22 DIAGNOSIS — Z95.0 CARDIAC PACEMAKER IN SITU: ICD-10-CM

## 2024-11-22 DIAGNOSIS — I49.5 SICK SINUS SYNDROME: ICD-10-CM

## 2024-11-22 DIAGNOSIS — Z86.79 S/P ABLATION OF ATRIAL FLUTTER: ICD-10-CM

## 2024-11-22 DIAGNOSIS — Z92.89 HISTORY OF CARDIOVERSION: ICD-10-CM

## 2024-11-22 DIAGNOSIS — Z98.890 S/P ABLATION OF ATRIAL FLUTTER: ICD-10-CM

## 2024-11-22 RX ORDER — METOPROLOL SUCCINATE 50 MG/1
50 TABLET, EXTENDED RELEASE ORAL 2 TIMES DAILY
Qty: 180 TABLET | Refills: 3 | Status: SHIPPED | OUTPATIENT
Start: 2024-11-22

## (undated) DEVICE — PAD DEFIB CADENCE ADULT R2

## (undated) DEVICE — INTRODUCER HEMOSTASIS 7.5F

## (undated) DEVICE — PAD RADI FEMORAL

## (undated) DEVICE — COVER DRAPE ACUSON STERILE

## (undated) DEVICE — SHEATH HEMOSTASIS 8.5FR

## (undated) DEVICE — INTRO FAST-CATH SL1 8.5FR 63CM

## (undated) DEVICE — CATH BIDIRECTIONAL DF CRV 7FR

## (undated) DEVICE — LINE PRESSURE MONITORING 96IN

## (undated) DEVICE — CATH NAV PENTARAY ECO 7F

## (undated) DEVICE — CATH ACUSON ACUNAV 8FR

## (undated) DEVICE — CATH THERMOCOOL SMTCH SF D F

## (undated) DEVICE — NDL TRNSSPTL BRK-1 18GA 98CM

## (undated) DEVICE — NDL TRNSSPTL BRK-1 18GA 71CM

## (undated) DEVICE — PACK EP DRAPE

## (undated) DEVICE — INTRO AGILIS MED CRL 8.5F 71CM

## (undated) DEVICE — PATCH CARTO REFERENCE

## (undated) DEVICE — ELECTRODE POLYHESIVEPRE-ATTACH

## (undated) DEVICE — CATH DECAPOLAR 6FRX110CM

## (undated) DEVICE — SET SMARTABLATE IRR TUBE

## (undated) DEVICE — SHEATH INTRODUCER 9FR 11CM